# Patient Record
Sex: FEMALE | Race: OTHER | NOT HISPANIC OR LATINO | Employment: FULL TIME | URBAN - METROPOLITAN AREA
[De-identification: names, ages, dates, MRNs, and addresses within clinical notes are randomized per-mention and may not be internally consistent; named-entity substitution may affect disease eponyms.]

---

## 2023-05-08 ENCOUNTER — OFFICE VISIT (OUTPATIENT)
Dept: PRIMARY CARE | Facility: CLINIC | Age: 48
End: 2023-05-08
Payer: COMMERCIAL

## 2023-05-08 VITALS
HEIGHT: 63 IN | WEIGHT: 213 LBS | TEMPERATURE: 98.1 F | SYSTOLIC BLOOD PRESSURE: 127 MMHG | HEART RATE: 79 BPM | DIASTOLIC BLOOD PRESSURE: 82 MMHG | OXYGEN SATURATION: 97 % | BODY MASS INDEX: 37.74 KG/M2

## 2023-05-08 DIAGNOSIS — Z00.00 HEALTHCARE MAINTENANCE: ICD-10-CM

## 2023-05-08 DIAGNOSIS — Z76.89 ENCOUNTER TO ESTABLISH CARE: Primary | ICD-10-CM

## 2023-05-08 DIAGNOSIS — R73.9 BLOOD GLUCOSE ELEVATED: ICD-10-CM

## 2023-05-08 PROCEDURE — 99214 OFFICE O/P EST MOD 30 MIN: CPT | Performed by: STUDENT IN AN ORGANIZED HEALTH CARE EDUCATION/TRAINING PROGRAM

## 2023-05-08 PROCEDURE — 4010F ACE/ARB THERAPY RXD/TAKEN: CPT | Performed by: STUDENT IN AN ORGANIZED HEALTH CARE EDUCATION/TRAINING PROGRAM

## 2023-05-08 PROCEDURE — 1036F TOBACCO NON-USER: CPT | Performed by: STUDENT IN AN ORGANIZED HEALTH CARE EDUCATION/TRAINING PROGRAM

## 2023-05-08 PROCEDURE — 3046F HEMOGLOBIN A1C LEVEL >9.0%: CPT | Performed by: STUDENT IN AN ORGANIZED HEALTH CARE EDUCATION/TRAINING PROGRAM

## 2023-05-08 PROCEDURE — 3079F DIAST BP 80-89 MM HG: CPT | Performed by: STUDENT IN AN ORGANIZED HEALTH CARE EDUCATION/TRAINING PROGRAM

## 2023-05-08 PROCEDURE — 3074F SYST BP LT 130 MM HG: CPT | Performed by: STUDENT IN AN ORGANIZED HEALTH CARE EDUCATION/TRAINING PROGRAM

## 2023-05-08 ASSESSMENT — ENCOUNTER SYMPTOMS
DYSURIA: 0
LIGHT-HEADEDNESS: 0
MYALGIAS: 0
FEVER: 0
HEMATURIA: 0
COUGH: 0
SHORTNESS OF BREATH: 0
NAUSEA: 1
HEADACHES: 1
CHILLS: 0
DIARRHEA: 0
DIZZINESS: 1
POLYPHAGIA: 0
FATIGUE: 0
FREQUENCY: 1
ABDOMINAL PAIN: 0
PALPITATIONS: 0
CONSTIPATION: 0
POLYDIPSIA: 0
FLANK PAIN: 0
VOMITING: 0
ARTHRALGIAS: 0

## 2023-05-08 NOTE — PROGRESS NOTES
"Subjective   Patient ID: Pamela Sexton is a 47 y.o. female who presents for Establish Care (Elevated glucose ).    HPI     She is new to establish with a PCP at this office.  No acute concerns or complaints today.    She used to be on a medication for her migraines.  She has not had a migraine in about 7 years.  She thinks that it was stress related with her last job.    She has seen an Ob/Gyn to be established. She is up to date on her PAP smear and has an order for a mammogram and colonoscopy. She thinks that she saw them in January.    She has been monitoring her blood sugars at home.  She had gestational diabetes with her first child but not her second pregnancy which are twins.  She has diabetes on both sides of her family.    She had some labs for her work and she thinks her sugars are elevated.  Her fasting glucose levels have been in the 200s that she is measuring at home.    Review of Systems   Constitutional:  Negative for chills, fatigue and fever.   HENT:  Negative for congestion and postnasal drip.    Respiratory:  Negative for cough and shortness of breath.    Cardiovascular:  Negative for chest pain and palpitations.   Gastrointestinal:  Positive for nausea (intermittently). Negative for abdominal pain, constipation, diarrhea and vomiting.   Endocrine: Positive for polyuria. Negative for polydipsia and polyphagia.   Genitourinary:  Positive for frequency. Negative for dysuria, flank pain and hematuria.   Musculoskeletal:  Negative for arthralgias and myalgias.   Neurological:  Positive for dizziness (intermittent) and headaches. Negative for light-headedness.       Objective   /82   Pulse 79   Temp 36.7 °C (98.1 °F)   Ht 1.6 m (5' 3\")   Wt 96.6 kg (213 lb)   SpO2 97%   BMI 37.73 kg/m²     Physical Exam  Vitals and nursing note reviewed.   Constitutional:       General: She is not in acute distress.     Appearance: Normal appearance. She is obese. She is not ill-appearing or " toxic-appearing.   Cardiovascular:      Rate and Rhythm: Normal rate and regular rhythm.      Heart sounds: Normal heart sounds.   Pulmonary:      Effort: Pulmonary effort is normal.      Breath sounds: Normal breath sounds.   Abdominal:      General: Abdomen is flat. Bowel sounds are normal.      Palpations: Abdomen is soft.   Neurological:      Mental Status: She is alert.         Assessment/Plan   Problem List Items Addressed This Visit    None  Visit Diagnoses       Encounter to establish care    -  Primary    Blood glucose elevated        Relevant Orders    CBC and Auto Differential (Completed)    Comprehensive Metabolic Panel (Completed)    Lipid Panel (Completed)    Urinalysis with Reflex Microscopic (Completed)    Vitamin D, Total (Completed)    TSH with reflex to Free T4 if abnormal (Completed)    Hemoglobin A1C (Completed)    Albumin , Urine Random (Completed)    Healthcare maintenance        Relevant Orders    CBC and Auto Differential (Completed)    Comprehensive Metabolic Panel (Completed)    Lipid Panel (Completed)    Urinalysis with Reflex Microscopic (Completed)    Vitamin D, Total (Completed)    TSH with reflex to Free T4 if abnormal (Completed)          Patient presenting to establish care.  Blood glucose levels are elevated at home and diagnosis of diabetes is suspected.  Ordered lab work including an A1c as well as urine albumin.  Patient will have these done and then follow-up in the next 1 to 2 weeks for a wellness examination.  Printed off and given information and discussed healthy diet and exercise in addition to diabetic dieting and carbohydrate counting.  She will start making dietary changes as needed.  We will follow-up with any other health concerns at next appointment.  She is understanding and in agreement with this plan.

## 2023-05-09 ENCOUNTER — LAB (OUTPATIENT)
Dept: LAB | Facility: LAB | Age: 48
End: 2023-05-09
Payer: COMMERCIAL

## 2023-05-09 DIAGNOSIS — R73.9 BLOOD GLUCOSE ELEVATED: ICD-10-CM

## 2023-05-09 DIAGNOSIS — Z00.00 HEALTHCARE MAINTENANCE: ICD-10-CM

## 2023-05-09 PROCEDURE — 82043 UR ALBUMIN QUANTITATIVE: CPT

## 2023-05-09 PROCEDURE — 84443 ASSAY THYROID STIM HORMONE: CPT

## 2023-05-09 PROCEDURE — 85025 COMPLETE CBC W/AUTO DIFF WBC: CPT

## 2023-05-09 PROCEDURE — 82570 ASSAY OF URINE CREATININE: CPT

## 2023-05-09 PROCEDURE — 82306 VITAMIN D 25 HYDROXY: CPT

## 2023-05-09 PROCEDURE — 83036 HEMOGLOBIN GLYCOSYLATED A1C: CPT

## 2023-05-09 PROCEDURE — 36415 COLL VENOUS BLD VENIPUNCTURE: CPT

## 2023-05-09 PROCEDURE — 81001 URINALYSIS AUTO W/SCOPE: CPT

## 2023-05-09 PROCEDURE — 80053 COMPREHEN METABOLIC PANEL: CPT

## 2023-05-09 PROCEDURE — 80061 LIPID PANEL: CPT

## 2023-05-10 LAB
ALANINE AMINOTRANSFERASE (SGPT) (U/L) IN SER/PLAS: 113 U/L (ref 7–45)
ALBUMIN (G/DL) IN SER/PLAS: 4 G/DL (ref 3.4–5)
ALBUMIN (MG/L) IN URINE: 47.1 MG/L
ALBUMIN/CREATININE (UG/MG) IN URINE: 18.5 UG/MG CRT (ref 0–30)
ALKALINE PHOSPHATASE (U/L) IN SER/PLAS: 77 U/L (ref 33–110)
ANION GAP IN SER/PLAS: 14 MMOL/L (ref 10–20)
APPEARANCE, URINE: ABNORMAL
ASPARTATE AMINOTRANSFERASE (SGOT) (U/L) IN SER/PLAS: 67 U/L (ref 9–39)
BASOPHILS (10*3/UL) IN BLOOD BY AUTOMATED COUNT: 0.08 X10E9/L (ref 0–0.1)
BASOPHILS/100 LEUKOCYTES IN BLOOD BY AUTOMATED COUNT: 0.9 % (ref 0–2)
BILIRUBIN TOTAL (MG/DL) IN SER/PLAS: 0.5 MG/DL (ref 0–1.2)
BILIRUBIN, URINE: NEGATIVE
BLOOD, URINE: NEGATIVE
CALCIDIOL (25 OH VITAMIN D3) (NG/ML) IN SER/PLAS: 17 NG/ML
CALCIUM (MG/DL) IN SER/PLAS: 9.4 MG/DL (ref 8.6–10.6)
CARBON DIOXIDE, TOTAL (MMOL/L) IN SER/PLAS: 25 MMOL/L (ref 21–32)
CHLORIDE (MMOL/L) IN SER/PLAS: 102 MMOL/L (ref 98–107)
CHOLESTEROL (MG/DL) IN SER/PLAS: 174 MG/DL (ref 0–199)
CHOLESTEROL IN HDL (MG/DL) IN SER/PLAS: 51.1 MG/DL
CHOLESTEROL/HDL RATIO: 3.4
COLOR, URINE: YELLOW
CREATININE (MG/DL) IN SER/PLAS: 0.74 MG/DL (ref 0.5–1.05)
CREATININE (MG/DL) IN URINE: 254 MG/DL (ref 20–320)
EOSINOPHILS (10*3/UL) IN BLOOD BY AUTOMATED COUNT: 0.33 X10E9/L (ref 0–0.7)
EOSINOPHILS/100 LEUKOCYTES IN BLOOD BY AUTOMATED COUNT: 3.8 % (ref 0–6)
ERYTHROCYTE DISTRIBUTION WIDTH (RATIO) BY AUTOMATED COUNT: 13.2 % (ref 11.5–14.5)
ERYTHROCYTE MEAN CORPUSCULAR HEMOGLOBIN CONCENTRATION (G/DL) BY AUTOMATED: 31.1 G/DL (ref 32–36)
ERYTHROCYTE MEAN CORPUSCULAR VOLUME (FL) BY AUTOMATED COUNT: 89 FL (ref 80–100)
ERYTHROCYTES (10*6/UL) IN BLOOD BY AUTOMATED COUNT: 5.22 X10E12/L (ref 4–5.2)
ESTIMATED AVERAGE GLUCOSE FOR HBA1C: 252 MG/DL
GFR FEMALE: >90 ML/MIN/1.73M2
GLUCOSE (MG/DL) IN SER/PLAS: 222 MG/DL (ref 74–99)
GLUCOSE, URINE: ABNORMAL MG/DL
HEMATOCRIT (%) IN BLOOD BY AUTOMATED COUNT: 46.6 % (ref 36–46)
HEMOGLOBIN (G/DL) IN BLOOD: 14.5 G/DL (ref 12–16)
HEMOGLOBIN A1C/HEMOGLOBIN TOTAL IN BLOOD: 10.4 %
IMMATURE GRANULOCYTES/100 LEUKOCYTES IN BLOOD BY AUTOMATED COUNT: 1 % (ref 0–0.9)
KETONES, URINE: ABNORMAL MG/DL
LDL: 85 MG/DL (ref 0–99)
LEUKOCYTE ESTERASE, URINE: ABNORMAL
LEUKOCYTES (10*3/UL) IN BLOOD BY AUTOMATED COUNT: 8.7 X10E9/L (ref 4.4–11.3)
LYMPHOCYTES (10*3/UL) IN BLOOD BY AUTOMATED COUNT: 2.17 X10E9/L (ref 1.2–4.8)
LYMPHOCYTES/100 LEUKOCYTES IN BLOOD BY AUTOMATED COUNT: 24.9 % (ref 13–44)
MONOCYTES (10*3/UL) IN BLOOD BY AUTOMATED COUNT: 0.53 X10E9/L (ref 0.1–1)
MONOCYTES/100 LEUKOCYTES IN BLOOD BY AUTOMATED COUNT: 6.1 % (ref 2–10)
MUCUS, URINE: NORMAL /LPF
NEUTROPHILS (10*3/UL) IN BLOOD BY AUTOMATED COUNT: 5.52 X10E9/L (ref 1.2–7.7)
NEUTROPHILS/100 LEUKOCYTES IN BLOOD BY AUTOMATED COUNT: 63.3 % (ref 40–80)
NITRITE, URINE: NEGATIVE
NRBC (PER 100 WBCS) BY AUTOMATED COUNT: 0 /100 WBC (ref 0–0)
PH, URINE: 5 (ref 5–8)
PLATELETS (10*3/UL) IN BLOOD AUTOMATED COUNT: 304 X10E9/L (ref 150–450)
POTASSIUM (MMOL/L) IN SER/PLAS: 4.1 MMOL/L (ref 3.5–5.3)
PROTEIN TOTAL: 6.9 G/DL (ref 6.4–8.2)
PROTEIN, URINE: NEGATIVE MG/DL
RBC, URINE: NORMAL /HPF (ref 0–5)
SODIUM (MMOL/L) IN SER/PLAS: 137 MMOL/L (ref 136–145)
SPECIFIC GRAVITY, URINE: 1.03 (ref 1–1.03)
SQUAMOUS EPITHELIAL CELLS, URINE: 6 /HPF
THYROTROPIN (MIU/L) IN SER/PLAS BY DETECTION LIMIT <= 0.05 MIU/L: 2.87 MIU/L (ref 0.44–3.98)
TRIGLYCERIDE (MG/DL) IN SER/PLAS: 189 MG/DL (ref 0–149)
UREA NITROGEN (MG/DL) IN SER/PLAS: 16 MG/DL (ref 6–23)
UROBILINOGEN, URINE: <2 MG/DL (ref 0–1.9)
VLDL: 38 MG/DL (ref 0–40)
WBC, URINE: NORMAL /HPF (ref 0–5)

## 2023-05-18 ENCOUNTER — OFFICE VISIT (OUTPATIENT)
Dept: PRIMARY CARE | Facility: CLINIC | Age: 48
End: 2023-05-18
Payer: COMMERCIAL

## 2023-05-18 VITALS
HEART RATE: 82 BPM | TEMPERATURE: 98.1 F | HEIGHT: 63 IN | SYSTOLIC BLOOD PRESSURE: 122 MMHG | WEIGHT: 211 LBS | OXYGEN SATURATION: 98 % | BODY MASS INDEX: 37.39 KG/M2 | DIASTOLIC BLOOD PRESSURE: 82 MMHG

## 2023-05-18 DIAGNOSIS — K76.0 FATTY LIVER: ICD-10-CM

## 2023-05-18 DIAGNOSIS — Z00.00 ENCOUNTER FOR WELLNESS EXAMINATION IN ADULT: Primary | ICD-10-CM

## 2023-05-18 DIAGNOSIS — E55.9 VITAMIN D DEFICIENCY: ICD-10-CM

## 2023-05-18 DIAGNOSIS — E11.65 TYPE 2 DIABETES MELLITUS WITH HYPERGLYCEMIA, WITHOUT LONG-TERM CURRENT USE OF INSULIN (MULTI): ICD-10-CM

## 2023-05-18 PROCEDURE — 99396 PREV VISIT EST AGE 40-64: CPT | Performed by: STUDENT IN AN ORGANIZED HEALTH CARE EDUCATION/TRAINING PROGRAM

## 2023-05-18 PROCEDURE — 3079F DIAST BP 80-89 MM HG: CPT | Performed by: STUDENT IN AN ORGANIZED HEALTH CARE EDUCATION/TRAINING PROGRAM

## 2023-05-18 PROCEDURE — 3046F HEMOGLOBIN A1C LEVEL >9.0%: CPT | Performed by: STUDENT IN AN ORGANIZED HEALTH CARE EDUCATION/TRAINING PROGRAM

## 2023-05-18 PROCEDURE — 3074F SYST BP LT 130 MM HG: CPT | Performed by: STUDENT IN AN ORGANIZED HEALTH CARE EDUCATION/TRAINING PROGRAM

## 2023-05-18 PROCEDURE — 4010F ACE/ARB THERAPY RXD/TAKEN: CPT | Performed by: STUDENT IN AN ORGANIZED HEALTH CARE EDUCATION/TRAINING PROGRAM

## 2023-05-18 PROCEDURE — 1036F TOBACCO NON-USER: CPT | Performed by: STUDENT IN AN ORGANIZED HEALTH CARE EDUCATION/TRAINING PROGRAM

## 2023-05-18 RX ORDER — LISINOPRIL 2.5 MG/1
2.5 TABLET ORAL DAILY
Qty: 30 TABLET | Refills: 1 | Status: SHIPPED | OUTPATIENT
Start: 2023-05-18 | End: 2023-07-13 | Stop reason: SDUPTHER

## 2023-05-18 RX ORDER — ERGOCALCIFEROL 1.25 MG/1
50000 CAPSULE ORAL
Qty: 8 CAPSULE | Refills: 0 | Status: SHIPPED | OUTPATIENT
Start: 2023-05-18 | End: 2023-07-13

## 2023-05-18 RX ORDER — METFORMIN HYDROCHLORIDE 500 MG/1
500 TABLET, EXTENDED RELEASE ORAL
Qty: 90 TABLET | Refills: 0 | Status: SHIPPED | OUTPATIENT
Start: 2023-05-18 | End: 2023-06-02 | Stop reason: SDUPTHER

## 2023-05-18 ASSESSMENT — ENCOUNTER SYMPTOMS
LIGHT-HEADEDNESS: 0
VOMITING: 0
NAUSEA: 0
RHINORRHEA: 0
ARTHRALGIAS: 0
MYALGIAS: 0
COUGH: 0
DIZZINESS: 0
SHORTNESS OF BREATH: 0
FREQUENCY: 0
DYSPHORIC MOOD: 0
NERVOUS/ANXIOUS: 0
CONSTIPATION: 0
FATIGUE: 0
CHILLS: 0
NUMBNESS: 0
FEVER: 0
DYSURIA: 0
PALPITATIONS: 0
COLOR CHANGE: 0
ABDOMINAL PAIN: 0
DIARRHEA: 0

## 2023-05-18 NOTE — PROGRESS NOTES
"Subjective   Patient ID: Pamela Sexton is a 47 y.o. female who presents for Annual Exam.    HPI     She would like to discuss her blood sugars.  She is starting to  eat more fruits and vegetables and paying attention to what she is eating.  She read through the diabetic diet handouts given last time.    Dental: Has upcoming appointment in June 2023.  Vision: Glasses. Last eye exam January 2023    Diet: Well balanced. Working at incorporating diabetic diet.  Exercise: Treadmill.   Caffeine: Some Coffee.  Fluids: Plenty  Supplements: None.    Tobacco: None. Previously smoked for 3 years. Back in high school.  Alcohol: Rarely. 2-3 times a year.  Recreational Drugs: None    Last Colonoscopy: She has an order placed. She needs to call for an appointment.  Last Pap smear: Seeing with Ob/Gyn. PAP done January 2023.  Mammogram: Order placed. She will be calling to get it scheduled.    Influenza: Up to date.  Tetanus: More than 10 years. Recommended.  Pneumonia: Recommended   COVID: Recommended    Review of Systems   Constitutional:  Negative for chills, fatigue and fever.   HENT:  Negative for congestion and rhinorrhea.    Eyes:  Negative for visual disturbance.   Respiratory:  Negative for cough and shortness of breath.    Cardiovascular:  Negative for chest pain and palpitations.   Gastrointestinal:  Negative for abdominal pain, constipation, diarrhea, nausea and vomiting.   Genitourinary:  Negative for dysuria and frequency.   Musculoskeletal:  Negative for arthralgias and myalgias.   Skin:  Negative for color change and rash.   Neurological:  Negative for dizziness, light-headedness and numbness.   Psychiatric/Behavioral:  Negative for dysphoric mood. The patient is not nervous/anxious.        Objective   /82   Pulse 82   Temp 36.7 °C (98.1 °F)   Ht 1.6 m (5' 3\")   Wt 95.7 kg (211 lb)   SpO2 98%   BMI 37.38 kg/m²   BSA Body surface area is 2.06 meters squared.      Physical Exam  Cardiovascular:      Rate " and Rhythm: Normal rate.       Lab on 05/09/2023   Component Date Value Ref Range Status    WBC 05/09/2023 8.7  4.4 - 11.3 x10E9/L Final    nRBC 05/09/2023 0.0  0.0 - 0.0 /100 WBC Final    RBC 05/09/2023 5.22 (H)  4.00 - 5.20 x10E12/L Final    Hemoglobin 05/09/2023 14.5  12.0 - 16.0 g/dL Final    Hematocrit 05/09/2023 46.6 (H)  36.0 - 46.0 % Final    MCV 05/09/2023 89  80 - 100 fL Final    MCHC 05/09/2023 31.1 (L)  32.0 - 36.0 g/dL Final    Platelets 05/09/2023 304  150 - 450 x10E9/L Final    RDW 05/09/2023 13.2  11.5 - 14.5 % Final    Neutrophils % 05/09/2023 63.3  40.0 - 80.0 % Final    Immature Granulocytes %, Automated 05/09/2023 1.0 (H)  0.0 - 0.9 % Final     Immature Granulocyte Count (IG) includes promyelocytes,    myelocytes and metamyelocytes but does not include bands.   Percent differential counts (%) should be interpreted in the   context of the absolute cell counts (cells/L).    Lymphocytes % 05/09/2023 24.9  13.0 - 44.0 % Final    Monocytes % 05/09/2023 6.1  2.0 - 10.0 % Final    Eosinophils % 05/09/2023 3.8  0.0 - 6.0 % Final    Basophils % 05/09/2023 0.9  0.0 - 2.0 % Final    Neutrophils Absolute 05/09/2023 5.52  1.20 - 7.70 x10E9/L Final    Lymphocytes Absolute 05/09/2023 2.17  1.20 - 4.80 x10E9/L Final    Monocytes Absolute 05/09/2023 0.53  0.10 - 1.00 x10E9/L Final    Eosinophils Absolute 05/09/2023 0.33  0.00 - 0.70 x10E9/L Final    Basophils Absolute 05/09/2023 0.08  0.00 - 0.10 x10E9/L Final    Glucose 05/09/2023 222 (H)  74 - 99 mg/dL Final    Sodium 05/09/2023 137  136 - 145 mmol/L Final    Potassium 05/09/2023 4.1  3.5 - 5.3 mmol/L Final    Chloride 05/09/2023 102  98 - 107 mmol/L Final    Bicarbonate 05/09/2023 25  21 - 32 mmol/L Final    Anion Gap 05/09/2023 14  10 - 20 mmol/L Final    Urea Nitrogen 05/09/2023 16  6 - 23 mg/dL Final    Creatinine 05/09/2023 0.74  0.50 - 1.05 mg/dL Final    GFR Female 05/09/2023 >90  >90 mL/min/1.73m2 Final     CALCULATIONS OF ESTIMATED GFR ARE PERFORMED    USING THE 2021 CKD-EPI STUDY REFIT EQUATION   WITHOUT THE RACE VARIABLE FOR THE IDMS-TRACEABLE   CREATININE METHODS.    https://jasn.asnjournals.org/content/early/2021/09/22/ASN.4095712824    Calcium 05/09/2023 9.4  8.6 - 10.6 mg/dL Final    Albumin 05/09/2023 4.0  3.4 - 5.0 g/dL Final    Alkaline Phosphatase 05/09/2023 77  33 - 110 U/L Final    Total Protein 05/09/2023 6.9  6.4 - 8.2 g/dL Final    AST 05/09/2023 67 (H)  9 - 39 U/L Final    Total Bilirubin 05/09/2023 0.5  0.0 - 1.2 mg/dL Final    ALT (SGPT) 05/09/2023 113 (H)  7 - 45 U/L Final     Patients treated with Sulfasalazine may generate    falsely decreased results for ALT.    Cholesterol 05/09/2023 174  0 - 199 mg/dL Final    .      AGE      DESIRABLE   BORDERLINE HIGH   HIGH     0-19 Y     0 - 169       170 - 199     >/= 200    20-24 Y     0 - 189       190 - 224     >/= 225         >24 Y     0 - 199       200 - 239     >/= 240   **All ranges are based on fasting samples. Specific   therapeutic targets will vary based on patient-specific   cardiac risk.  .   Pediatric guidelines reference:Pediatrics 2011, 128(S5).   Adult guidelines reference: NCEP ATPIII Guidelines,     PRIYANKA 2001, 258:2486-97  .   Venipuncture immediately after or during the    administration of Metamizole may lead to falsely   low results. Testing should be performed immediately   prior to Metamizole dosing.    HDL 05/09/2023 51.1  mg/dL Final    .      AGE      VERY LOW   LOW     NORMAL    HIGH       0-19 Y       < 35   < 40     40-45     ----    20-24 Y       ----   < 40       >45     ----      >24 Y       ----   < 40     40-60      >60  .    Cholesterol/HDL Ratio 05/09/2023 3.4   Final    REF VALUES  DESIRABLE  < 3.4  HIGH RISK  > 5.0    LDL 05/09/2023 85  0 - 99 mg/dL Final    .                           NEAR      BORD      AGE      DESIRABLE  OPTIMAL    HIGH     HIGH     VERY HIGH     0-19 Y     0 - 109     ---    110-129   >/= 130     ----    20-24 Y     0 - 119     ---    120-159    >/= 160     ----      >24 Y     0 -  99   100-129  130-159   160-189     >/=190  .    VLDL 05/09/2023 38  0 - 40 mg/dL Final    Triglycerides 05/09/2023 189 (H)  0 - 149 mg/dL Final    .      AGE      DESIRABLE   BORDERLINE HIGH   HIGH     VERY HIGH   0 D-90 D    19 - 174         ----         ----        ----  91 D- 9 Y     0 -  74        75 -  99     >/= 100      ----    10-19 Y     0 -  89        90 - 129     >/= 130      ----    20-24 Y     0 - 114       115 - 149     >/= 150      ----         >24 Y     0 - 149       150 - 199    200- 499    >/= 500  .   Venipuncture immediately after or during the    administration of Metamizole may lead to falsely   low results. Testing should be performed immediately   prior to Metamizole dosing.    Color, Urine 05/09/2023 YELLOW  STRAW,YELLOW Final    Appearance, Urine 05/09/2023 HAZY  CLEAR Final    Specific Gravity, Urine 05/09/2023 1.030  1.005 - 1.035 Final    pH, Urine 05/09/2023 5.0  5.0 - 8.0 Final    Protein, Urine 05/09/2023 NEGATIVE  NEGATIVE mg/dL Final    Glucose, Urine 05/09/2023 50 (TRACE) (A)  NEGATIVE mg/dL Final    Blood, Urine 05/09/2023 NEGATIVE  NEGATIVE Final    Ketones, Urine 05/09/2023 5 (TRACE) (A)  NEGATIVE mg/dL Final    Bilirubin, Urine 05/09/2023 NEGATIVE  NEGATIVE Final    Urobilinogen, Urine 05/09/2023 <2.0  0.0 - 1.9 mg/dL Final    Nitrite, Urine 05/09/2023 NEGATIVE  NEGATIVE Final    Leukocyte Esterase, Urine 05/09/2023 TRACE (A)  NEGATIVE Final    Vitamin D, 25-Hydroxy 05/09/2023 17 (A)  ng/mL Final    .  DEFICIENCY:         < 20   NG/ML  INSUFFICIENCY:      20-29  NG/ML  SUFFICIENCY:         NG/ML    THIS ASSAY ACCURATELY QUANTIFIES THE SUM OF  VITAMIN D3, 25-HYDROXY AND VIT D2,25-HYDROXY.    TSH 05/09/2023 2.87  0.44 - 3.98 mIU/L Final     TSH testing is performed using different testing    methodology at [facility] than at other    system hospitals. Direct result comparisons should    only be made within the same  method.    Hemoglobin A1C 05/09/2023 10.4 (A)  % Final         Diagnosis of Diabetes-Adults   Non-Diabetic: < or = 5.6%   Increased risk for developing diabetes: 5.7-6.4%   Diagnostic of diabetes: > or = 6.5%  .       Monitoring of Diabetes                Age (y)     Therapeutic Goal (%)   Adults:          >18           <7.0   Pediatrics:    13-18           <7.5                   7-12           <8.0                   0- 6            7.5-8.5   American Diabetes Association. Diabetes Care 33(S1), Jan 2010.    Estimated Average Glucose 05/09/2023 252  MG/DL Final    ALBUMIN (MG/L) IN URINE 05/09/2023 47.1  Not Established mg/L Final    Albumin/Creatine Ratio 05/09/2023 18.5  0.0 - 30.0 ug/mg crt Final    Creatinine, Urine 05/09/2023 254.0  20.0 - 320.0 mg/dL Final    WBC, Urine 05/09/2023 None  0 - 5 /HPF Final    RBC, Urine 05/09/2023 None  0 - 5 /HPF Final    Squamous Epithelial Cells, Urine 05/09/2023 6  /HPF Final    Mucus, Urine 05/09/2023 4+  /LPF Final     No current outpatient medications on file prior to visit.     No current facility-administered medications on file prior to visit.     No images are attached to the encounter.        Assessment/Plan   Problem List Items Addressed This Visit          Digestive    Fatty liver     Diagnosed previously.  Cholesterol levels within the normal range.  Mildly elevated triglycerides.  Patient working at improving her diet.  Planning on rechecking levels at a later time.            Endocrine/Metabolic    Type 2 diabetes mellitus with hyperglycemia, without long-term current use of insulin (CMS/[facility])     Recently diagnosed. Patient started on metformin. Overall doing well. She is making significant dietary changes to reduce her blood sugars. She is incorporating more of a diabetic diet. Plan on repeating A1c in 3 months.          Relevant Medications    lisinopril 2.5 mg tablet    Other Relevant Orders    Comprehensive Metabolic Panel    CBC and Auto Differential     Vitamin D deficiency     Replacement vitamin D levels ordered.  Follow-up vitamin D level ordered to be done in about 2 months.         Relevant Medications    ergocalciferol (Vitamin D-2) 1.25 MG (64393 UT) capsule    Other Relevant Orders    Vitamin D, Total       Other    Encounter for wellness examination in adult - Primary     Patient is overall doing well.  No acute concerns or complaints today.  She has significantly altered her diet and is read through the diabetic diet handouts given to her at her last appointment.  She is improving what she is eating and is generally starting to feel much better.  Continued to encourage healthy diet and exercise.  Metformin and lisinopril ordered.  Reviewed the results of her recent lab work.  Vitamin D replacement ordered with repeat levels in 2 months.  We will also recheck her CMP and CBC.  Plan on repeat metformin in about 3 months.  Patient will come in for a 1 month checkup to see how she is continuing to do.  She will come in sooner for acute concerns or complaints.

## 2023-05-18 NOTE — PATIENT INSTRUCTIONS
Carol Pastizzy  [employer]    Please take a multivitamin daily for extra vitamin.    Start with the metformin 1 tablet once a day, then after a few days take 1 tablet twice daily, then after a few days 1 tablet in the morning and 2 tablets in the evening,  then after a few days take 2 tablets in the morning and 2 in the evening.    Take fasting blood glucose every morning. Can take at any other time as well. Make a log and bring with you to your next appointment.    Follow up in about 5 weeks.

## 2023-06-02 DIAGNOSIS — E11.65 TYPE 2 DIABETES MELLITUS WITH HYPERGLYCEMIA, WITHOUT LONG-TERM CURRENT USE OF INSULIN (MULTI): ICD-10-CM

## 2023-06-02 RX ORDER — METFORMIN HYDROCHLORIDE 500 MG/1
1000 TABLET, EXTENDED RELEASE ORAL
Qty: 360 TABLET | Refills: 1 | Status: SHIPPED | OUTPATIENT
Start: 2023-06-02 | End: 2023-12-05 | Stop reason: SDUPTHER

## 2023-06-04 NOTE — ASSESSMENT & PLAN NOTE
Diagnosed previously.  Cholesterol levels within the normal range.  Mildly elevated triglycerides.  Patient working at improving her diet.  Planning on rechecking levels at a later time.

## 2023-06-04 NOTE — ASSESSMENT & PLAN NOTE
Recently diagnosed. Patient started on metformin. Overall doing well. She is making significant dietary changes to reduce her blood sugars. She is incorporating more of a diabetic diet. Plan on repeating A1c in 3 months.

## 2023-06-04 NOTE — ASSESSMENT & PLAN NOTE
Patient is overall doing well.  No acute concerns or complaints today.  She has significantly altered her diet and is read through the diabetic diet handouts given to her at her last appointment.  She is improving what she is eating and is generally starting to feel much better.  Continued to encourage healthy diet and exercise.  Metformin and lisinopril ordered.  Reviewed the results of her recent lab work.  Vitamin D replacement ordered with repeat levels in 2 months.  We will also recheck her CMP and CBC.  Plan on repeat metformin in about 3 months.  Patient will come in for a 1 month checkup to see how she is continuing to do.  She will come in sooner for acute concerns or complaints.

## 2023-06-04 NOTE — ASSESSMENT & PLAN NOTE
Replacement vitamin D levels ordered.  Follow-up vitamin D level ordered to be done in about 2 months.

## 2023-06-20 ENCOUNTER — LAB (OUTPATIENT)
Dept: LAB | Facility: LAB | Age: 48
End: 2023-06-20
Payer: COMMERCIAL

## 2023-06-20 DIAGNOSIS — E55.9 VITAMIN D DEFICIENCY: ICD-10-CM

## 2023-06-20 DIAGNOSIS — E11.65 TYPE 2 DIABETES MELLITUS WITH HYPERGLYCEMIA, WITHOUT LONG-TERM CURRENT USE OF INSULIN (MULTI): ICD-10-CM

## 2023-06-20 PROCEDURE — 85025 COMPLETE CBC W/AUTO DIFF WBC: CPT

## 2023-06-20 PROCEDURE — 36415 COLL VENOUS BLD VENIPUNCTURE: CPT

## 2023-06-20 PROCEDURE — 82306 VITAMIN D 25 HYDROXY: CPT

## 2023-06-20 PROCEDURE — 80053 COMPREHEN METABOLIC PANEL: CPT

## 2023-06-21 LAB
ALANINE AMINOTRANSFERASE (SGPT) (U/L) IN SER/PLAS: 59 U/L (ref 7–45)
ALBUMIN (G/DL) IN SER/PLAS: 4.5 G/DL (ref 3.4–5)
ALKALINE PHOSPHATASE (U/L) IN SER/PLAS: 96 U/L (ref 33–110)
ANION GAP IN SER/PLAS: 12 MMOL/L (ref 10–20)
ASPARTATE AMINOTRANSFERASE (SGOT) (U/L) IN SER/PLAS: 28 U/L (ref 9–39)
BASOPHILS (10*3/UL) IN BLOOD BY AUTOMATED COUNT: 0.08 X10E9/L (ref 0–0.1)
BASOPHILS/100 LEUKOCYTES IN BLOOD BY AUTOMATED COUNT: 0.7 % (ref 0–2)
BILIRUBIN TOTAL (MG/DL) IN SER/PLAS: 0.3 MG/DL (ref 0–1.2)
CALCIDIOL (25 OH VITAMIN D3) (NG/ML) IN SER/PLAS: 46 NG/ML
CALCIUM (MG/DL) IN SER/PLAS: 9.6 MG/DL (ref 8.6–10.6)
CARBON DIOXIDE, TOTAL (MMOL/L) IN SER/PLAS: 27 MMOL/L (ref 21–32)
CHLORIDE (MMOL/L) IN SER/PLAS: 103 MMOL/L (ref 98–107)
CREATININE (MG/DL) IN SER/PLAS: 0.69 MG/DL (ref 0.5–1.05)
EOSINOPHILS (10*3/UL) IN BLOOD BY AUTOMATED COUNT: 0.49 X10E9/L (ref 0–0.7)
EOSINOPHILS/100 LEUKOCYTES IN BLOOD BY AUTOMATED COUNT: 4 % (ref 0–6)
ERYTHROCYTE DISTRIBUTION WIDTH (RATIO) BY AUTOMATED COUNT: 13.2 % (ref 11.5–14.5)
ERYTHROCYTE MEAN CORPUSCULAR HEMOGLOBIN CONCENTRATION (G/DL) BY AUTOMATED: 32.1 G/DL (ref 32–36)
ERYTHROCYTE MEAN CORPUSCULAR VOLUME (FL) BY AUTOMATED COUNT: 88 FL (ref 80–100)
ERYTHROCYTES (10*6/UL) IN BLOOD BY AUTOMATED COUNT: 5.03 X10E12/L (ref 4–5.2)
GFR FEMALE: >90 ML/MIN/1.73M2
GLUCOSE (MG/DL) IN SER/PLAS: 135 MG/DL (ref 74–99)
HEMATOCRIT (%) IN BLOOD BY AUTOMATED COUNT: 44.3 % (ref 36–46)
HEMOGLOBIN (G/DL) IN BLOOD: 14.2 G/DL (ref 12–16)
IMMATURE GRANULOCYTES/100 LEUKOCYTES IN BLOOD BY AUTOMATED COUNT: 0.5 % (ref 0–0.9)
LEUKOCYTES (10*3/UL) IN BLOOD BY AUTOMATED COUNT: 12.1 X10E9/L (ref 4.4–11.3)
LYMPHOCYTES (10*3/UL) IN BLOOD BY AUTOMATED COUNT: 3.11 X10E9/L (ref 1.2–4.8)
LYMPHOCYTES/100 LEUKOCYTES IN BLOOD BY AUTOMATED COUNT: 25.6 % (ref 13–44)
MONOCYTES (10*3/UL) IN BLOOD BY AUTOMATED COUNT: 0.65 X10E9/L (ref 0.1–1)
MONOCYTES/100 LEUKOCYTES IN BLOOD BY AUTOMATED COUNT: 5.4 % (ref 2–10)
NEUTROPHILS (10*3/UL) IN BLOOD BY AUTOMATED COUNT: 7.75 X10E9/L (ref 1.2–7.7)
NEUTROPHILS/100 LEUKOCYTES IN BLOOD BY AUTOMATED COUNT: 63.8 % (ref 40–80)
NRBC (PER 100 WBCS) BY AUTOMATED COUNT: 0 /100 WBC (ref 0–0)
PLATELETS (10*3/UL) IN BLOOD AUTOMATED COUNT: 382 X10E9/L (ref 150–450)
POTASSIUM (MMOL/L) IN SER/PLAS: 3.9 MMOL/L (ref 3.5–5.3)
PROTEIN TOTAL: 7.6 G/DL (ref 6.4–8.2)
SODIUM (MMOL/L) IN SER/PLAS: 138 MMOL/L (ref 136–145)
UREA NITROGEN (MG/DL) IN SER/PLAS: 13 MG/DL (ref 6–23)

## 2023-06-22 ENCOUNTER — APPOINTMENT (OUTPATIENT)
Dept: PRIMARY CARE | Facility: CLINIC | Age: 48
End: 2023-06-22
Payer: COMMERCIAL

## 2023-06-26 ENCOUNTER — OFFICE VISIT (OUTPATIENT)
Dept: PRIMARY CARE | Facility: CLINIC | Age: 48
End: 2023-06-26
Payer: COMMERCIAL

## 2023-06-26 VITALS
DIASTOLIC BLOOD PRESSURE: 75 MMHG | OXYGEN SATURATION: 98 % | HEIGHT: 63 IN | HEART RATE: 71 BPM | SYSTOLIC BLOOD PRESSURE: 112 MMHG | TEMPERATURE: 97.4 F | BODY MASS INDEX: 37.21 KG/M2 | WEIGHT: 210 LBS

## 2023-06-26 DIAGNOSIS — E11.65 TYPE 2 DIABETES MELLITUS WITH HYPERGLYCEMIA, WITHOUT LONG-TERM CURRENT USE OF INSULIN (MULTI): Primary | ICD-10-CM

## 2023-06-26 DIAGNOSIS — K76.0 FATTY LIVER: ICD-10-CM

## 2023-06-26 DIAGNOSIS — E55.9 VITAMIN D DEFICIENCY: ICD-10-CM

## 2023-06-26 PROCEDURE — 3046F HEMOGLOBIN A1C LEVEL >9.0%: CPT | Performed by: STUDENT IN AN ORGANIZED HEALTH CARE EDUCATION/TRAINING PROGRAM

## 2023-06-26 PROCEDURE — 3078F DIAST BP <80 MM HG: CPT | Performed by: STUDENT IN AN ORGANIZED HEALTH CARE EDUCATION/TRAINING PROGRAM

## 2023-06-26 PROCEDURE — 4010F ACE/ARB THERAPY RXD/TAKEN: CPT | Performed by: STUDENT IN AN ORGANIZED HEALTH CARE EDUCATION/TRAINING PROGRAM

## 2023-06-26 PROCEDURE — 99213 OFFICE O/P EST LOW 20 MIN: CPT | Performed by: STUDENT IN AN ORGANIZED HEALTH CARE EDUCATION/TRAINING PROGRAM

## 2023-06-26 PROCEDURE — 1036F TOBACCO NON-USER: CPT | Performed by: STUDENT IN AN ORGANIZED HEALTH CARE EDUCATION/TRAINING PROGRAM

## 2023-06-26 PROCEDURE — 3074F SYST BP LT 130 MM HG: CPT | Performed by: STUDENT IN AN ORGANIZED HEALTH CARE EDUCATION/TRAINING PROGRAM

## 2023-06-26 ASSESSMENT — ENCOUNTER SYMPTOMS
RHINORRHEA: 0
CONSTIPATION: 0
DIARRHEA: 0
VOMITING: 0
CHILLS: 0
FATIGUE: 0
LIGHT-HEADEDNESS: 0
FEVER: 0
ABDOMINAL PAIN: 0
PALPITATIONS: 0
FREQUENCY: 0
NUMBNESS: 0
COUGH: 0
COLOR CHANGE: 0
SHORTNESS OF BREATH: 0
DYSPHORIC MOOD: 0
DIZZINESS: 0
DYSURIA: 0
NERVOUS/ANXIOUS: 0
NAUSEA: 0

## 2023-06-26 NOTE — PROGRESS NOTES
"Subjective   Patient ID: Pamela Sexton is a 47 y.o. female who presents for Follow-up.    HPI     Blood sugars running around the 140s at home. Most recent CMP with glucose of 135.  She states that she has been doing really well at home with helping to fix her diet.  She states that she has been taking the metformin as prescribed.  She did have a little bit of symptoms when for starting but she denies having any more at this point.  She is really been watching her carbohydrate intake.  She has been trying to do more exercise on a regular basis as well.  Discussed continued management of diabetes.  She would like to wait to see what her glucose control is when she is next due for her A1c before any other medications at this time.  She feels that she is made pretty substantial improvements since her last appointment.    Review of Systems   Constitutional:  Negative for chills, fatigue and fever.   HENT:  Negative for congestion and rhinorrhea.    Respiratory:  Negative for cough and shortness of breath.    Cardiovascular:  Negative for chest pain and palpitations.   Gastrointestinal:  Negative for abdominal pain, constipation, diarrhea, nausea and vomiting.   Genitourinary:  Negative for dysuria and frequency.   Skin:  Negative for color change and rash.   Neurological:  Negative for dizziness, light-headedness and numbness.   Psychiatric/Behavioral:  Negative for dysphoric mood. The patient is not nervous/anxious.        Objective   /75   Pulse 71   Temp 36.3 °C (97.4 °F)   Ht 1.6 m (5' 3\")   Wt 95.3 kg (210 lb)   SpO2 98%   BMI 37.20 kg/m²     Physical Exam  Vitals and nursing note reviewed.   Constitutional:       General: She is not in acute distress.     Appearance: Normal appearance. She is obese. She is not ill-appearing or toxic-appearing.   Cardiovascular:      Rate and Rhythm: Normal rate and regular rhythm.      Heart sounds: Normal heart sounds.   Pulmonary:      Effort: Pulmonary effort is " normal.      Breath sounds: Normal breath sounds.   Abdominal:      General: Abdomen is flat. Bowel sounds are normal.      Palpations: Abdomen is soft.   Neurological:      Mental Status: She is alert.         Assessment/Plan   Problem List Items Addressed This Visit       Type 2 diabetes mellitus with hyperglycemia, without long-term current use of insulin (CMS/[facility]) - Primary    Relevant Orders    Hemoglobin A1C    Vitamin D deficiency    Fatty liver    Relevant Orders    Hemoglobin A1C     Patient overall doing really well.  She has been making significant dietary changes.  Most recent CMP with a blood glucose of 135.  Her previous CMP had a glucose of about 220.  She would like to continue with current management for now before making any adjustments or additional medications.  She will continue metformin.  No refills needed at this time.  Repeat A1c ordered to be done 3 months after her prior.  We will follow-up on control after that point.  She will follow-up sooner for acute concerns or complaints.

## 2023-06-26 NOTE — PATIENT INSTRUCTIONS
Finish the Vitamin D prescription. Then take about 4000 units of vitamin D daily if you can.    Follow up in August, about a week after your next A1c. You can get the next A1c on or after 8/10/23

## 2023-07-13 DIAGNOSIS — E11.65 TYPE 2 DIABETES MELLITUS WITH HYPERGLYCEMIA, WITHOUT LONG-TERM CURRENT USE OF INSULIN (MULTI): ICD-10-CM

## 2023-07-13 RX ORDER — LISINOPRIL 2.5 MG/1
2.5 TABLET ORAL DAILY
Qty: 90 TABLET | Refills: 1 | Status: SHIPPED | OUTPATIENT
Start: 2023-07-13 | End: 2023-12-05 | Stop reason: SDUPTHER

## 2023-08-17 DIAGNOSIS — R92.8 ABNORMAL MAMMOGRAM OF RIGHT BREAST: Primary | ICD-10-CM

## 2023-08-22 ENCOUNTER — LAB (OUTPATIENT)
Dept: LAB | Facility: LAB | Age: 48
End: 2023-08-22
Payer: COMMERCIAL

## 2023-08-22 DIAGNOSIS — E11.65 TYPE 2 DIABETES MELLITUS WITH HYPERGLYCEMIA, WITHOUT LONG-TERM CURRENT USE OF INSULIN (MULTI): ICD-10-CM

## 2023-08-22 DIAGNOSIS — K76.0 FATTY LIVER: ICD-10-CM

## 2023-08-22 PROCEDURE — 36415 COLL VENOUS BLD VENIPUNCTURE: CPT

## 2023-08-22 PROCEDURE — 83036 HEMOGLOBIN GLYCOSYLATED A1C: CPT

## 2023-08-23 LAB
ESTIMATED AVERAGE GLUCOSE FOR HBA1C: 157 MG/DL
HEMOGLOBIN A1C/HEMOGLOBIN TOTAL IN BLOOD: 7.1 %

## 2023-08-28 ENCOUNTER — OFFICE VISIT (OUTPATIENT)
Dept: PRIMARY CARE | Facility: CLINIC | Age: 48
End: 2023-08-28
Payer: COMMERCIAL

## 2023-08-28 VITALS
SYSTOLIC BLOOD PRESSURE: 139 MMHG | BODY MASS INDEX: 37.74 KG/M2 | WEIGHT: 213 LBS | OXYGEN SATURATION: 98 % | HEIGHT: 63 IN | DIASTOLIC BLOOD PRESSURE: 86 MMHG | TEMPERATURE: 97.3 F | HEART RATE: 76 BPM

## 2023-08-28 DIAGNOSIS — R93.3 ABNORMAL COLONOSCOPY: ICD-10-CM

## 2023-08-28 DIAGNOSIS — E11.65 TYPE 2 DIABETES MELLITUS WITH HYPERGLYCEMIA, WITHOUT LONG-TERM CURRENT USE OF INSULIN (MULTI): Primary | ICD-10-CM

## 2023-08-28 DIAGNOSIS — R92.8 ABNORMAL MAMMOGRAM: ICD-10-CM

## 2023-08-28 PROCEDURE — 3075F SYST BP GE 130 - 139MM HG: CPT | Performed by: STUDENT IN AN ORGANIZED HEALTH CARE EDUCATION/TRAINING PROGRAM

## 2023-08-28 PROCEDURE — 4010F ACE/ARB THERAPY RXD/TAKEN: CPT | Performed by: STUDENT IN AN ORGANIZED HEALTH CARE EDUCATION/TRAINING PROGRAM

## 2023-08-28 PROCEDURE — 99214 OFFICE O/P EST MOD 30 MIN: CPT | Performed by: STUDENT IN AN ORGANIZED HEALTH CARE EDUCATION/TRAINING PROGRAM

## 2023-08-28 PROCEDURE — 3051F HG A1C>EQUAL 7.0%<8.0%: CPT | Performed by: STUDENT IN AN ORGANIZED HEALTH CARE EDUCATION/TRAINING PROGRAM

## 2023-08-28 PROCEDURE — 3079F DIAST BP 80-89 MM HG: CPT | Performed by: STUDENT IN AN ORGANIZED HEALTH CARE EDUCATION/TRAINING PROGRAM

## 2023-08-28 PROCEDURE — 1036F TOBACCO NON-USER: CPT | Performed by: STUDENT IN AN ORGANIZED HEALTH CARE EDUCATION/TRAINING PROGRAM

## 2023-08-28 ASSESSMENT — ENCOUNTER SYMPTOMS
FATIGUE: 0
FREQUENCY: 0
NAUSEA: 0
COUGH: 0
RHINORRHEA: 0
FEVER: 0
MYALGIAS: 0
VOMITING: 0
ABDOMINAL PAIN: 0
CONSTIPATION: 0
SHORTNESS OF BREATH: 0
CHILLS: 0
ARTHRALGIAS: 0
DIARRHEA: 0

## 2023-08-28 NOTE — PROGRESS NOTES
"Subjective   Patient ID: Pamela Sexton is a 47 y.o. female who presents for Follow-up.    HPI     Overall she is doing well.  Patient states she is doing really well with her overall diet.  She has cut out pretty much all sugary drinks.  Her average blood sugar in the morning is right between 140-150.  She denies any symptoms or current side effects.  She is not sure if she wants to try any additional medications.    She had her colonoscopy done. They will be calling her for pathology results for polyps. They will determine follow up but will be within 5 years.    She is scheduled for the mammogram repeat and ultrasound on 8/30/2023.  She has been under more stress especially with some of the abnormal findings on her recent mammogram.    Review of Systems   Constitutional:  Negative for chills, fatigue and fever.   HENT:  Negative for congestion and rhinorrhea.    Respiratory:  Negative for cough and shortness of breath.    Cardiovascular:  Negative for chest pain.   Gastrointestinal:  Negative for abdominal pain, constipation, diarrhea, nausea and vomiting.   Genitourinary:  Negative for frequency.   Musculoskeletal:  Negative for arthralgias and myalgias.       Objective   /86   Pulse 76   Temp 36.3 °C (97.3 °F)   Ht 1.6 m (5' 3\")   Wt 96.6 kg (213 lb)   SpO2 98%   BMI 37.73 kg/m²     Physical Exam  Vitals and nursing note reviewed.   Constitutional:       General: She is not in acute distress.     Appearance: Normal appearance. She is obese. She is not ill-appearing or toxic-appearing.   HENT:      Head: Normocephalic and atraumatic.   Cardiovascular:      Rate and Rhythm: Normal rate and regular rhythm.      Heart sounds: Normal heart sounds.   Pulmonary:      Effort: Pulmonary effort is normal.      Breath sounds: Normal breath sounds.   Abdominal:      General: Bowel sounds are normal.      Palpations: Abdomen is soft.   Neurological:      Mental Status: She is alert.         Assessment/Plan "   Problem List Items Addressed This Visit       Type 2 diabetes mellitus with hyperglycemia, without long-term current use of insulin (CMS/[facility]) - Primary    Relevant Medications    semaglutide 0.25 mg or 0.5 mg (2 mg/3 mL) pen injector    Other Relevant Orders    Hemoglobin A1C    Lipid Panel    Comprehensive Metabolic Panel     Other Visit Diagnoses       Abnormal mammogram        Abnormal colonoscopy              History and physical examination as above.  Patient overall doing well with her diabetic diet and follow-up.  A1c significantly improved.  Plan for repeat A1c and 3 months.  We will also repeat lipid panel and metabolic panel at that time.  Discussed medication risks, benefits and side effects of starting on Ozempic in addition for benefits for possible weight loss as patient continues with her diet and increase his exercise.  She is agreeable to starting on medication.  We will start at initial dose of 0.25 mg for the next 6 weeks.  She will call for refill.  Discussed to call if she runs into issues getting the medication filled.  She will continue with follow-up for her mammogram and we will contact her in regards to results.  She will call if she does not hear back within 1 week.  Instructed patient to keep in contact with GI about when she will be due for her next colonoscopy.  She is understanding and in agreement with this plan.

## 2023-08-28 NOTE — PATIENT INSTRUCTIONS
Thank you for coming in for your appointment.    You are doing really well with your diabetic control.  Please continue to keep up healthy diet and exercise as discussed.  We will try starting you on Ozempic as this should help with diabetic control in addition to weight loss.  You can continue on your current dose of metformin for now.  If your glucose levels start to improve in the early mornings, you can go down to taking 3 tablets of metformin daily and then possibly down to only twice a day.  Please let me know when making any of these changes.    Please call if the Ozempic medication ends up being fairly expensive.  There may be other options available.  The injectable medication with Ozempic is once a week.  We will start with giving you 6 weeks worth of the medication.  Please call for refills.    Please get labs done on or after 11/24/2023.  We will be repeating the cholesterol levels, the hemoglobin A1c and a metabolic panel.    Please keep track of blood pressures at home.  If they are elevated, we can adjust the dose of your lisinopril medication.    We can plan on a follow-up in about 3 months after you get your lab work done.  You can set this appointment up today.    Please call with any other questions or concerns.    Thank you

## 2023-10-30 DIAGNOSIS — E11.65 TYPE 2 DIABETES MELLITUS WITH HYPERGLYCEMIA, WITHOUT LONG-TERM CURRENT USE OF INSULIN (MULTI): ICD-10-CM

## 2023-11-05 DIAGNOSIS — E11.65 TYPE 2 DIABETES MELLITUS WITH HYPERGLYCEMIA, WITHOUT LONG-TERM CURRENT USE OF INSULIN (MULTI): Primary | ICD-10-CM

## 2023-11-05 RX ORDER — SEMAGLUTIDE 0.68 MG/ML
INJECTION, SOLUTION SUBCUTANEOUS
Qty: 3 ML | Refills: 0 | OUTPATIENT
Start: 2023-11-05

## 2023-11-30 ENCOUNTER — LAB (OUTPATIENT)
Dept: LAB | Facility: LAB | Age: 48
End: 2023-11-30
Payer: COMMERCIAL

## 2023-11-30 DIAGNOSIS — E11.65 TYPE 2 DIABETES MELLITUS WITH HYPERGLYCEMIA, WITHOUT LONG-TERM CURRENT USE OF INSULIN (MULTI): ICD-10-CM

## 2023-11-30 PROCEDURE — 80053 COMPREHEN METABOLIC PANEL: CPT

## 2023-11-30 PROCEDURE — 36415 COLL VENOUS BLD VENIPUNCTURE: CPT

## 2023-11-30 PROCEDURE — 80061 LIPID PANEL: CPT

## 2023-11-30 PROCEDURE — 83036 HEMOGLOBIN GLYCOSYLATED A1C: CPT

## 2023-12-01 LAB
ALBUMIN SERPL BCP-MCNC: 4.6 G/DL (ref 3.4–5)
ALP SERPL-CCNC: 56 U/L (ref 33–110)
ALT SERPL W P-5'-P-CCNC: 42 U/L (ref 7–45)
ANION GAP SERPL CALC-SCNC: 17 MMOL/L (ref 10–20)
AST SERPL W P-5'-P-CCNC: 26 U/L (ref 9–39)
BILIRUB SERPL-MCNC: 0.5 MG/DL (ref 0–1.2)
BUN SERPL-MCNC: 11 MG/DL (ref 6–23)
CALCIUM SERPL-MCNC: 9.6 MG/DL (ref 8.6–10.6)
CHLORIDE SERPL-SCNC: 102 MMOL/L (ref 98–107)
CHOLEST SERPL-MCNC: 153 MG/DL (ref 0–199)
CHOLESTEROL/HDL RATIO: 3.2
CO2 SERPL-SCNC: 23 MMOL/L (ref 21–32)
CREAT SERPL-MCNC: 0.85 MG/DL (ref 0.5–1.05)
EST. AVERAGE GLUCOSE BLD GHB EST-MCNC: 131 MG/DL
GFR SERPL CREATININE-BSD FRML MDRD: 86 ML/MIN/1.73M*2
GLUCOSE SERPL-MCNC: 94 MG/DL (ref 74–99)
HBA1C MFR BLD: 6.2 %
HDLC SERPL-MCNC: 47.9 MG/DL
LDLC SERPL CALC-MCNC: 76 MG/DL
NON HDL CHOLESTEROL: 105 MG/DL (ref 0–149)
POTASSIUM SERPL-SCNC: 4 MMOL/L (ref 3.5–5.3)
PROT SERPL-MCNC: 7.7 G/DL (ref 6.4–8.2)
SODIUM SERPL-SCNC: 138 MMOL/L (ref 136–145)
TRIGL SERPL-MCNC: 145 MG/DL (ref 0–149)
VLDL: 29 MG/DL (ref 0–40)

## 2023-12-05 ENCOUNTER — OFFICE VISIT (OUTPATIENT)
Dept: PRIMARY CARE | Facility: CLINIC | Age: 48
End: 2023-12-05
Payer: COMMERCIAL

## 2023-12-05 VITALS
HEART RATE: 83 BPM | OXYGEN SATURATION: 98 % | BODY MASS INDEX: 36.32 KG/M2 | DIASTOLIC BLOOD PRESSURE: 77 MMHG | TEMPERATURE: 97.1 F | WEIGHT: 205 LBS | SYSTOLIC BLOOD PRESSURE: 120 MMHG | HEIGHT: 63 IN

## 2023-12-05 DIAGNOSIS — E11.65 TYPE 2 DIABETES MELLITUS WITH HYPERGLYCEMIA, WITHOUT LONG-TERM CURRENT USE OF INSULIN (MULTI): Primary | ICD-10-CM

## 2023-12-05 PROCEDURE — 3074F SYST BP LT 130 MM HG: CPT | Performed by: STUDENT IN AN ORGANIZED HEALTH CARE EDUCATION/TRAINING PROGRAM

## 2023-12-05 PROCEDURE — 3078F DIAST BP <80 MM HG: CPT | Performed by: STUDENT IN AN ORGANIZED HEALTH CARE EDUCATION/TRAINING PROGRAM

## 2023-12-05 PROCEDURE — 3048F LDL-C <100 MG/DL: CPT | Performed by: STUDENT IN AN ORGANIZED HEALTH CARE EDUCATION/TRAINING PROGRAM

## 2023-12-05 PROCEDURE — 4010F ACE/ARB THERAPY RXD/TAKEN: CPT | Performed by: STUDENT IN AN ORGANIZED HEALTH CARE EDUCATION/TRAINING PROGRAM

## 2023-12-05 PROCEDURE — 3044F HG A1C LEVEL LT 7.0%: CPT | Performed by: STUDENT IN AN ORGANIZED HEALTH CARE EDUCATION/TRAINING PROGRAM

## 2023-12-05 PROCEDURE — 99214 OFFICE O/P EST MOD 30 MIN: CPT | Performed by: STUDENT IN AN ORGANIZED HEALTH CARE EDUCATION/TRAINING PROGRAM

## 2023-12-05 PROCEDURE — 1036F TOBACCO NON-USER: CPT | Performed by: STUDENT IN AN ORGANIZED HEALTH CARE EDUCATION/TRAINING PROGRAM

## 2023-12-05 RX ORDER — METFORMIN HYDROCHLORIDE 500 MG/1
1000 TABLET, EXTENDED RELEASE ORAL
Qty: 360 TABLET | Refills: 1 | Status: SHIPPED | OUTPATIENT
Start: 2023-12-05 | End: 2024-06-02

## 2023-12-05 RX ORDER — LISINOPRIL 2.5 MG/1
2.5 TABLET ORAL DAILY
Qty: 90 TABLET | Refills: 1 | Status: SHIPPED | OUTPATIENT
Start: 2023-12-05 | End: 2024-06-02

## 2023-12-05 ASSESSMENT — ENCOUNTER SYMPTOMS
VOMITING: 0
ABDOMINAL PAIN: 0
FATIGUE: 0
SHORTNESS OF BREATH: 0
NAUSEA: 0
FEVER: 0
COUGH: 0
CONSTIPATION: 0
DIARRHEA: 0
MYALGIAS: 0
FREQUENCY: 0
RHINORRHEA: 0
ARTHRALGIAS: 0
CHILLS: 0

## 2023-12-05 ASSESSMENT — PATIENT HEALTH QUESTIONNAIRE - PHQ9
2. FEELING DOWN, DEPRESSED OR HOPELESS: NOT AT ALL
SUM OF ALL RESPONSES TO PHQ9 QUESTIONS 1 AND 2: 0
1. LITTLE INTEREST OR PLEASURE IN DOING THINGS: NOT AT ALL

## 2023-12-05 NOTE — PROGRESS NOTES
"Subjective   Patient ID: Pamela Sexton is a 47 y.o. female who presents for Follow-up.    HPI     Overall doing well.  Still having some nausea and some bloating.  This started when she increased the dose of the Ozempic.  Patient is otherwise doing well.  She has been following her diabetic diet very well.  She has also noticed some moderate weight loss.  She would like to continue with her current medication doses.    Review of Systems   Constitutional:  Negative for chills, fatigue and fever.   HENT:  Negative for congestion and rhinorrhea.    Respiratory:  Negative for cough and shortness of breath.    Cardiovascular:  Negative for chest pain.   Gastrointestinal:  Negative for abdominal pain, constipation, diarrhea, nausea and vomiting.   Genitourinary:  Negative for frequency.   Musculoskeletal:  Negative for arthralgias and myalgias.       Objective   /77   Pulse 83   Temp 36.2 °C (97.1 °F)   Ht 1.6 m (5' 3\")   Wt 93 kg (205 lb)   SpO2 98%   BMI 36.31 kg/m²     Physical Exam  Vitals and nursing note reviewed.   Constitutional:       General: She is not in acute distress.     Appearance: Normal appearance. She is obese. She is not ill-appearing or toxic-appearing.   HENT:      Head: Normocephalic and atraumatic.   Cardiovascular:      Rate and Rhythm: Normal rate and regular rhythm.      Heart sounds: Normal heart sounds.   Pulmonary:      Effort: Pulmonary effort is normal.      Breath sounds: Normal breath sounds.   Abdominal:      General: Bowel sounds are normal.      Palpations: Abdomen is soft.   Neurological:      Mental Status: She is alert.         Assessment/Plan   Problem List Items Addressed This Visit             ICD-10-CM    Type 2 diabetes mellitus with hyperglycemia, without long-term current use of insulin (CMS/[facility]) - Primary E11.65    Relevant Medications    metFORMIN  mg 24 hr tablet    lisinopril 2.5 mg tablet    semaglutide 0.25 mg or 0.5 mg (2 mg/3 mL) pen injector    " Other Relevant Orders    Hemoglobin A1C     History and physical examination as above.  Patient presenting for follow-up for her diabetes.  Patient has been having excellent response with adjusting her diet, adding exercise and compliance with her medications.  She is also been noticing some moderate weight loss on the Ozempic.  Refilled medications for 6-month supply.  Sent in an additional month prescription for the Ozempic at the current dose.  Patient will call as long as her side effects are not worsening, we can continue at the same dose of medication.  Next A1c planned for March 2024.  We will plan on seeing the patient back for a wellness visit in June 2024.  Patient will call ahead for lab work orders.  She will call sooner for acute concerns or complaints.

## 2023-12-05 NOTE — PATIENT INSTRUCTIONS
Please continue to keep up all the good work with your healthy diet and exercise.      I sent in a 6-month supply of the lisinopril and metformin.  I sent in only 1 more pen of the Ozempic to see how you do with any sort of side effects.  Let me know how you are feeling towards the end of this next month of being on the medication.  If you still feel like everything is manageable.  That I can go ahead and send in for the same dose of the 0.5 mg with refills.  If he would like to go back down to the point to 5 mg, please let me know.    Please go ahead and get another A1c on or after 3/1/2024.  We will follow-up if this is significantly changed.    Please get scheduled for a wellness visit in June 2024.  We can discuss further findings at that time.    Please call sooner with any other acute concerns or complaints.    Thank you

## 2024-01-20 DIAGNOSIS — E11.65 TYPE 2 DIABETES MELLITUS WITH HYPERGLYCEMIA, WITHOUT LONG-TERM CURRENT USE OF INSULIN (MULTI): ICD-10-CM

## 2024-01-20 RX ORDER — SEMAGLUTIDE 0.68 MG/ML
INJECTION, SOLUTION SUBCUTANEOUS
Qty: 3 ML | Refills: 2 | Status: SHIPPED | OUTPATIENT
Start: 2024-01-20 | End: 2024-05-07

## 2024-02-29 ENCOUNTER — HOSPITAL ENCOUNTER (OUTPATIENT)
Dept: RADIOLOGY | Facility: CLINIC | Age: 49
Discharge: HOME | End: 2024-02-29
Payer: COMMERCIAL

## 2024-02-29 DIAGNOSIS — R92.8 ABNORMAL MAMMOGRAM OF RIGHT BREAST: ICD-10-CM

## 2024-02-29 PROCEDURE — 77061 BREAST TOMOSYNTHESIS UNI: CPT | Mod: RT

## 2024-02-29 PROCEDURE — 77065 DX MAMMO INCL CAD UNI: CPT | Mod: RIGHT SIDE | Performed by: RADIOLOGY

## 2024-02-29 PROCEDURE — 77061 BREAST TOMOSYNTHESIS UNI: CPT | Mod: RIGHT SIDE | Performed by: RADIOLOGY

## 2024-03-06 ENCOUNTER — LAB (OUTPATIENT)
Dept: LAB | Facility: LAB | Age: 49
End: 2024-03-06
Payer: COMMERCIAL

## 2024-03-06 DIAGNOSIS — E11.65 TYPE 2 DIABETES MELLITUS WITH HYPERGLYCEMIA, WITHOUT LONG-TERM CURRENT USE OF INSULIN (MULTI): ICD-10-CM

## 2024-03-06 PROCEDURE — 83036 HEMOGLOBIN GLYCOSYLATED A1C: CPT

## 2024-03-06 PROCEDURE — 36415 COLL VENOUS BLD VENIPUNCTURE: CPT

## 2024-03-07 LAB
EST. AVERAGE GLUCOSE BLD GHB EST-MCNC: 117 MG/DL
HBA1C MFR BLD: 5.7 %

## 2024-05-04 DIAGNOSIS — E11.65 TYPE 2 DIABETES MELLITUS WITH HYPERGLYCEMIA, WITHOUT LONG-TERM CURRENT USE OF INSULIN (MULTI): ICD-10-CM

## 2024-05-07 RX ORDER — SEMAGLUTIDE 0.68 MG/ML
INJECTION, SOLUTION SUBCUTANEOUS
Qty: 3 ML | Refills: 2 | Status: SHIPPED | OUTPATIENT
Start: 2024-05-07

## 2024-06-10 DIAGNOSIS — E11.65 TYPE 2 DIABETES MELLITUS WITH HYPERGLYCEMIA, WITHOUT LONG-TERM CURRENT USE OF INSULIN (MULTI): ICD-10-CM

## 2024-06-11 ENCOUNTER — APPOINTMENT (OUTPATIENT)
Dept: PRIMARY CARE | Facility: CLINIC | Age: 49
End: 2024-06-11
Payer: COMMERCIAL

## 2024-06-11 ENCOUNTER — LAB (OUTPATIENT)
Dept: LAB | Facility: LAB | Age: 49
End: 2024-06-11
Payer: COMMERCIAL

## 2024-06-11 VITALS
WEIGHT: 204 LBS | BODY MASS INDEX: 36.14 KG/M2 | HEIGHT: 63 IN | OXYGEN SATURATION: 96 % | TEMPERATURE: 98 F | HEART RATE: 80 BPM | DIASTOLIC BLOOD PRESSURE: 72 MMHG | SYSTOLIC BLOOD PRESSURE: 105 MMHG

## 2024-06-11 DIAGNOSIS — Z12.31 BREAST CANCER SCREENING BY MAMMOGRAM: ICD-10-CM

## 2024-06-11 DIAGNOSIS — E11.65 TYPE 2 DIABETES MELLITUS WITH HYPERGLYCEMIA, WITHOUT LONG-TERM CURRENT USE OF INSULIN (MULTI): ICD-10-CM

## 2024-06-11 DIAGNOSIS — Z00.00 ENCOUNTER FOR WELLNESS EXAMINATION IN ADULT: Primary | ICD-10-CM

## 2024-06-11 LAB
EST. AVERAGE GLUCOSE BLD GHB EST-MCNC: 120 MG/DL
HBA1C MFR BLD: 5.8 %

## 2024-06-11 PROCEDURE — 83036 HEMOGLOBIN GLYCOSYLATED A1C: CPT

## 2024-06-11 PROCEDURE — 36415 COLL VENOUS BLD VENIPUNCTURE: CPT

## 2024-06-11 PROCEDURE — 99396 PREV VISIT EST AGE 40-64: CPT | Performed by: STUDENT IN AN ORGANIZED HEALTH CARE EDUCATION/TRAINING PROGRAM

## 2024-06-11 PROCEDURE — 3078F DIAST BP <80 MM HG: CPT | Performed by: STUDENT IN AN ORGANIZED HEALTH CARE EDUCATION/TRAINING PROGRAM

## 2024-06-11 PROCEDURE — 3044F HG A1C LEVEL LT 7.0%: CPT | Performed by: STUDENT IN AN ORGANIZED HEALTH CARE EDUCATION/TRAINING PROGRAM

## 2024-06-11 PROCEDURE — 3074F SYST BP LT 130 MM HG: CPT | Performed by: STUDENT IN AN ORGANIZED HEALTH CARE EDUCATION/TRAINING PROGRAM

## 2024-06-11 RX ORDER — BISMUTH SUBSALICYLATE 262 MG
1 TABLET,CHEWABLE ORAL DAILY
COMMUNITY

## 2024-06-11 ASSESSMENT — ENCOUNTER SYMPTOMS
PALPITATIONS: 0
FEVER: 0
NAUSEA: 0
SHORTNESS OF BREATH: 0
NUMBNESS: 0
DIZZINESS: 0
CONSTIPATION: 0
LIGHT-HEADEDNESS: 0
CHILLS: 0
ABDOMINAL PAIN: 0
MYALGIAS: 0
FREQUENCY: 0
DYSPHORIC MOOD: 0
DIARRHEA: 0
COUGH: 0
FATIGUE: 0
COLOR CHANGE: 0
ARTHRALGIAS: 0
DYSURIA: 0
RHINORRHEA: 0
VOMITING: 0
NERVOUS/ANXIOUS: 0

## 2024-06-11 ASSESSMENT — PATIENT HEALTH QUESTIONNAIRE - PHQ9
2. FEELING DOWN, DEPRESSED OR HOPELESS: NOT AT ALL
1. LITTLE INTEREST OR PLEASURE IN DOING THINGS: NOT AT ALL
SUM OF ALL RESPONSES TO PHQ9 QUESTIONS 1 AND 2: 0

## 2024-06-11 NOTE — PATIENT INSTRUCTIONS
Thank you for coming in for your annual wellness examination.    Excellent work with management of your diabetes at home.  Keep up all the good work with healthy diet and regular exercise.  Please call for any medication refills that we need.    I will plan on an A1c in the next 3 months.  We will plan on a follow-up visit in 6 months.  Please get that appointment scheduled before you leave here today.    I did go ahead and place order for bilateral mammogram screening.  This can be done on or after August 30 of this year.  Please call us in the next week if you do not hear from them about getting scheduled.    If you would like to try a 3-month supply of the Ozempic to your mail order pharmacy, please call them first just to make sure and depending on the cost.  We can send in a prescription to your pharmacy if you will need.  Please send a message with the medication dosing and where you would like it sent.    Please keep up all the good work.  Please call sooner with any other acute concerns or complaints.    Thank you

## 2024-06-11 NOTE — PROGRESS NOTES
"Subjective   Patient ID: Pamela Sexton is a 48 y.o. female who presents for Annual Exam.    HPI     No acute concerns or complaints today.    Dental: Twice yearly exams.  Vision: Corrective lenses with glasses. Yearly exams.    Diet: Well balanced diet. Diabetic diet.  Exercise: Yard work.   Caffeine: Coffee maybe 1 cup daily.  Fluids: Well hydrated.  Supplements: Multivitamin    Tobacco: None. Previously smoked for 3 years. Back in high school.  Alcohol: Rarely. 2-3 times a year.  Recreational Drugs: None    Last Colonoscopy: 08/15/2023. Follow up in 5 years in 2028.  Last Pap smear: Following with Ob/Gyn January 2024. Had pelvic ultrasound done in March 2024.  Mammogram: 02/29/2024. Recommended regular mammogram in 6 months in 08/2024. Order placed.  Low Dose Lung CT Screening: Not indicated.  AAA Screening: Not indicated.    Influenza: Up to date.  Tetanus: Recommended if injury.  COVID: Recommended updated vaccine.    Review of Systems   Constitutional:  Negative for chills, fatigue and fever.   HENT:  Negative for congestion and rhinorrhea.    Eyes:  Negative for visual disturbance.   Respiratory:  Negative for cough and shortness of breath.    Cardiovascular:  Negative for chest pain and palpitations.   Gastrointestinal:  Negative for abdominal pain, constipation, diarrhea, nausea and vomiting.   Genitourinary:  Negative for dysuria and frequency.   Musculoskeletal:  Negative for arthralgias and myalgias.   Skin:  Negative for color change and rash.   Neurological:  Negative for dizziness, light-headedness and numbness.   Psychiatric/Behavioral:  Negative for dysphoric mood. The patient is not nervous/anxious.        Objective   /72   Pulse 80   Temp 36.7 °C (98 °F)   Ht 1.6 m (5' 3\")   Wt 92.5 kg (204 lb)   SpO2 96%   BMI 36.14 kg/m²   BSA Body surface area is 2.03 meters squared.      Physical Exam  Vitals and nursing note reviewed.   Constitutional:       General: She is not in acute " distress.     Appearance: Normal appearance. She is normal weight. She is not ill-appearing or toxic-appearing.   HENT:      Head: Normocephalic and atraumatic.      Right Ear: Tympanic membrane, ear canal and external ear normal.      Left Ear: Tympanic membrane, ear canal and external ear normal.      Nose: Nose normal.      Mouth/Throat:      Mouth: Mucous membranes are moist.   Eyes:      Extraocular Movements: Extraocular movements intact.      Conjunctiva/sclera: Conjunctivae normal.      Pupils: Pupils are equal, round, and reactive to light.   Cardiovascular:      Rate and Rhythm: Normal rate and regular rhythm.      Pulses: Normal pulses.      Heart sounds: Normal heart sounds.   Pulmonary:      Effort: Pulmonary effort is normal.      Breath sounds: Normal breath sounds.   Abdominal:      General: Abdomen is flat. Bowel sounds are normal.      Palpations: Abdomen is soft.   Musculoskeletal:         General: Normal range of motion.      Cervical back: Normal range of motion and neck supple.   Skin:     General: Skin is warm.   Neurological:      General: No focal deficit present.      Mental Status: She is alert and oriented to person, place, and time. Mental status is at baseline.      Cranial Nerves: No cranial nerve deficit.      Sensory: No sensory deficit.   Psychiatric:         Mood and Affect: Mood normal.         Behavior: Behavior normal.         Thought Content: Thought content normal.         Judgment: Judgment normal.       Lab on 06/11/2024   Component Date Value Ref Range Status    Hemoglobin A1C 06/11/2024 5.8 (H)  see below % Final    Estimated Average Glucose 06/11/2024 120  Not Established mg/dL Final   Lab on 03/06/2024   Component Date Value Ref Range Status    Hemoglobin A1C 03/06/2024 5.7 (H)  see below % Final    Estimated Average Glucose 03/06/2024 117  Not Established mg/dL Final   Lab on 11/30/2023   Component Date Value Ref Range Status    Hemoglobin A1C 11/30/2023 6.2 (H)  see  below % Final    Estimated Average Glucose 11/30/2023 131  Not Established mg/dL Final    Cholesterol 11/30/2023 153  0 - 199 mg/dL Final          Age      Desirable   Borderline High   High     0-19 Y     0 - 169       170 - 199     >/= 200    20-24 Y     0 - 189       190 - 224     >/= 225         >24 Y     0 - 199       200 - 239     >/= 240   **All ranges are based on fasting samples. Specific   therapeutic targets will vary based on patient-specific   cardiac risk.    Pediatric guidelines reference:Pediatrics 2011, 128(S5).Adult guidelines reference: NCEP ATPIII Guidelines,PRIYANKA 2001, 258:2486-97    Venipuncture immediately after or during the administration of Metamizole may lead to falsely low results. Testing should be performed immediately prior to Metamizole dosing.    HDL-Cholesterol 11/30/2023 47.9  mg/dL Final      Age       Very Low   Low     Normal    High    0-19 Y    < 35      < 40     40-45     ----  20-24 Y    ----     < 40      >45      ----        >24 Y      ----     < 40     40-60      >60      Cholesterol/HDL Ratio 11/30/2023 3.2   Final      Ref Values  Desirable  < 3.4  High Risk  > 5.0    LDL Calculated 11/30/2023 76  <=99 mg/dL Final                                Near   Borderline      AGE      Desirable  Optimal    High     High     Very High     0-19 Y     0 - 109     ---    110-129   >/= 130     ----    20-24 Y     0 - 119     ---    120-159   >/= 160     ----      >24 Y     0 -  99   100-129  130-159   160-189     >/=190      VLDL 11/30/2023 29  0 - 40 mg/dL Final    Triglycerides 11/30/2023 145  0 - 149 mg/dL Final       Age         Desirable   Borderline High   High     Very High   0 D-90 D    19 - 174         ----         ----        ----  91 D- 9 Y     0 -  74        75 -  99     >/= 100      ----    10-19 Y     0 -  89        90 - 129     >/= 130      ----    20-24 Y     0 - 114       115 - 149     >/= 150      ----         >24 Y     0 - 149       150 - 199    200- 499    >/=  500    Venipuncture immediately after or during the administration of Metamizole may lead to falsely low results. Testing should be performed immediately prior to Metamizole dosing.    Non HDL Cholesterol 11/30/2023 105  0 - 149 mg/dL Final          Age       Desirable   Borderline High   High     Very High     0-19 Y     0 - 119       120 - 144     >/= 145    >/= 160    20-24 Y     0 - 149       150 - 189     >/= 190      ----         >24 Y    30 mg/dL above LDL Cholesterol goal      Glucose 11/30/2023 94  74 - 99 mg/dL Final    Sodium 11/30/2023 138  136 - 145 mmol/L Final    Potassium 11/30/2023 4.0  3.5 - 5.3 mmol/L Final    Chloride 11/30/2023 102  98 - 107 mmol/L Final    Bicarbonate 11/30/2023 23  21 - 32 mmol/L Final    Anion Gap 11/30/2023 17  10 - 20 mmol/L Final    Urea Nitrogen 11/30/2023 11  6 - 23 mg/dL Final    Creatinine 11/30/2023 0.85  0.50 - 1.05 mg/dL Final    eGFR 11/30/2023 86  >60 mL/min/1.73m*2 Final    Calculations of estimated GFR are performed using the 2021 CKD-EPI Study Refit equation without the race variable for the IDMS-Traceable creatinine methods.  https://jasn.asnjournals.org/content/early/2021/09/22/ASN.5479816558    Calcium 11/30/2023 9.6  8.6 - 10.6 mg/dL Final    Albumin 11/30/2023 4.6  3.4 - 5.0 g/dL Final    Alkaline Phosphatase 11/30/2023 56  33 - 110 U/L Final    Total Protein 11/30/2023 7.7  6.4 - 8.2 g/dL Final    AST 11/30/2023 26  9 - 39 U/L Final    Bilirubin, Total 11/30/2023 0.5  0.0 - 1.2 mg/dL Final    ALT 11/30/2023 42  7 - 45 U/L Final    Patients treated with Sulfasalazine may generate falsely decreased results for ALT.   Lab on 08/22/2023   Component Date Value Ref Range Status    Hemoglobin A1C 08/22/2023 7.1 (A)  % Final         Diagnosis of Diabetes-Adults   Non-Diabetic: < or = 5.6%   Increased risk for developing diabetes: 5.7-6.4%   Diagnostic of diabetes: > or = 6.5%  .       Monitoring of Diabetes                Age (y)     Therapeutic Goal (%)    Adults:          >18           <7.0   Pediatrics:    13-18           <7.5                   7-12           <8.0                   0- 6            7.5-8.5   American Diabetes Association. Diabetes Care 33(S1), Jan 2010.    Estimated Average Glucose 08/22/2023 157  MG/DL Final   Lab on 06/20/2023   Component Date Value Ref Range Status    Vitamin D, 25-Hydroxy 06/20/2023 46  ng/mL Final    .  DEFICIENCY:         < 20   NG/ML  INSUFFICIENCY:      20-29  NG/ML  SUFFICIENCY:         NG/ML    THIS ASSAY ACCURATELY QUANTIFIES THE SUM OF  VITAMIN D3, 25-HYDROXY AND VIT D2,25-HYDROXY.    Glucose 06/20/2023 135 (H)  74 - 99 mg/dL Final    Sodium 06/20/2023 138  136 - 145 mmol/L Final    Potassium 06/20/2023 3.9  3.5 - 5.3 mmol/L Final    Chloride 06/20/2023 103  98 - 107 mmol/L Final    Bicarbonate 06/20/2023 27  21 - 32 mmol/L Final    Anion Gap 06/20/2023 12  10 - 20 mmol/L Final    Urea Nitrogen 06/20/2023 13  6 - 23 mg/dL Final    Creatinine 06/20/2023 0.69  0.50 - 1.05 mg/dL Final    GFR Female 06/20/2023 >90  >90 mL/min/1.73m2 Final     CALCULATIONS OF ESTIMATED GFR ARE PERFORMED   USING THE 2021 CKD-EPI STUDY REFIT EQUATION   WITHOUT THE RACE VARIABLE FOR THE IDMS-TRACEABLE   CREATININE METHODS.    https://jasn.asnjournals.org/content/early/2021/09/22/ASN.9343255404    Calcium 06/20/2023 9.6  8.6 - 10.6 mg/dL Final    Albumin 06/20/2023 4.5  3.4 - 5.0 g/dL Final    Alkaline Phosphatase 06/20/2023 96  33 - 110 U/L Final    Total Protein 06/20/2023 7.6  6.4 - 8.2 g/dL Final    AST 06/20/2023 28  9 - 39 U/L Final    Total Bilirubin 06/20/2023 0.3  0.0 - 1.2 mg/dL Final    ALT (SGPT) 06/20/2023 59 (H)  7 - 45 U/L Final     Patients treated with Sulfasalazine may generate    falsely decreased results for ALT.    WBC 06/20/2023 12.1 (H)  4.4 - 11.3 x10E9/L Final    nRBC 06/20/2023 0.0  0.0 - 0.0 /100 WBC Final    RBC 06/20/2023 5.03  4.00 - 5.20 x10E12/L Final    Hemoglobin 06/20/2023 14.2  12.0 - 16.0 g/dL Final     Hematocrit 06/20/2023 44.3  36.0 - 46.0 % Final    MCV 06/20/2023 88  80 - 100 fL Final    MCHC 06/20/2023 32.1  32.0 - 36.0 g/dL Final    Platelets 06/20/2023 382  150 - 450 x10E9/L Final    RDW 06/20/2023 13.2  11.5 - 14.5 % Final    Neutrophils % 06/20/2023 63.8  40.0 - 80.0 % Final    Immature Granulocytes %, Automated 06/20/2023 0.5  0.0 - 0.9 % Final     Immature Granulocyte Count (IG) includes promyelocytes,    myelocytes and metamyelocytes but does not include bands.   Percent differential counts (%) should be interpreted in the   context of the absolute cell counts (cells/L).    Lymphocytes % 06/20/2023 25.6  13.0 - 44.0 % Final    Monocytes % 06/20/2023 5.4  2.0 - 10.0 % Final    Eosinophils % 06/20/2023 4.0  0.0 - 6.0 % Final    Basophils % 06/20/2023 0.7  0.0 - 2.0 % Final    Neutrophils Absolute 06/20/2023 7.75 (H)  1.20 - 7.70 x10E9/L Final    Lymphocytes Absolute 06/20/2023 3.11  1.20 - 4.80 x10E9/L Final    Monocytes Absolute 06/20/2023 0.65  0.10 - 1.00 x10E9/L Final    Eosinophils Absolute 06/20/2023 0.49  0.00 - 0.70 x10E9/L Final    Basophils Absolute 06/20/2023 0.08  0.00 - 0.10 x10E9/L Final     Current Outpatient Medications on File Prior to Visit   Medication Sig Dispense Refill    multivitamin tablet Take 1 tablet by mouth once daily.      Ozempic 0.25 mg or 0.5 mg (2 mg/3 mL) pen injector Inject 0.5 mg under the skin 1 (one) time per week. 3 mL 2    lisinopril 2.5 mg tablet Take 1 tablet (2.5 mg) by mouth once daily. 90 tablet 1    metFORMIN  mg 24 hr tablet Take 2 tablets (1,000 mg) by mouth 2 times a day with meals. Do not crush, chew, or split. 360 tablet 1     No current facility-administered medications on file prior to visit.     No images are attached to the encounter.        Assessment/Plan   Problem List Items Addressed This Visit             ICD-10-CM    Encounter for wellness examination in adult - Primary Z00.00     Other Visit Diagnoses         Codes    Breast cancer  screening by mammogram     Z12.31    Relevant Orders    BI mammo bilateral screening tomosynthesis          History and physical examination as above.  Patient presenting for annual wellness exam.  No acute concerns or complaints today.  Patient has been doing really well with management of her diabetes at home.  She will continue with healthy diet and regular exercise.  She will call for medication refills.  Plan for A1c in the next 3 months as well as a follow-up visit in 6 months.  Mammogram screening ordered.  Discussed multiple aspects of health including healthy diet and exercise, regular dental and vision care, caffeine use, hydration, supplementation, substance use, screening tests and immunizations.  Recommendations given as documented above.  Patient will continue with follow-up in 6 months.  Lab work to be ordered in 3 months.

## 2024-07-19 DIAGNOSIS — E11.65 TYPE 2 DIABETES MELLITUS WITH HYPERGLYCEMIA, WITHOUT LONG-TERM CURRENT USE OF INSULIN (MULTI): ICD-10-CM

## 2024-07-21 RX ORDER — METFORMIN HYDROCHLORIDE 500 MG/1
TABLET, EXTENDED RELEASE ORAL
Qty: 360 TABLET | Refills: 1 | Status: SHIPPED | OUTPATIENT
Start: 2024-07-21

## 2024-07-21 RX ORDER — LISINOPRIL 2.5 MG/1
2.5 TABLET ORAL DAILY
Qty: 90 TABLET | Refills: 1 | Status: SHIPPED | OUTPATIENT
Start: 2024-07-21 | End: 2025-01-17

## 2024-08-06 DIAGNOSIS — E11.65 TYPE 2 DIABETES MELLITUS WITH HYPERGLYCEMIA, WITHOUT LONG-TERM CURRENT USE OF INSULIN (MULTI): ICD-10-CM

## 2024-08-06 RX ORDER — SEMAGLUTIDE 0.68 MG/ML
INJECTION, SOLUTION SUBCUTANEOUS
Qty: 3 ML | Refills: 2 | Status: SHIPPED | OUTPATIENT
Start: 2024-08-06

## 2024-09-12 ENCOUNTER — LAB (OUTPATIENT)
Dept: LAB | Facility: LAB | Age: 49
End: 2024-09-12
Payer: COMMERCIAL

## 2024-09-12 ENCOUNTER — HOSPITAL ENCOUNTER (OUTPATIENT)
Dept: RADIOLOGY | Facility: CLINIC | Age: 49
Discharge: HOME | End: 2024-09-12
Payer: COMMERCIAL

## 2024-09-12 VITALS — WEIGHT: 204 LBS | HEIGHT: 63 IN | BODY MASS INDEX: 36.14 KG/M2

## 2024-09-12 DIAGNOSIS — E11.65 TYPE 2 DIABETES MELLITUS WITH HYPERGLYCEMIA, WITHOUT LONG-TERM CURRENT USE OF INSULIN (MULTI): ICD-10-CM

## 2024-09-12 DIAGNOSIS — R92.8 ABNORMAL MAMMOGRAM: Primary | ICD-10-CM

## 2024-09-12 DIAGNOSIS — Z12.31 BREAST CANCER SCREENING BY MAMMOGRAM: ICD-10-CM

## 2024-09-12 LAB
EST. AVERAGE GLUCOSE BLD GHB EST-MCNC: 131 MG/DL
HBA1C MFR BLD: 6.2 %

## 2024-09-12 PROCEDURE — 83036 HEMOGLOBIN GLYCOSYLATED A1C: CPT

## 2024-09-12 PROCEDURE — 36415 COLL VENOUS BLD VENIPUNCTURE: CPT

## 2024-09-17 ENCOUNTER — HOSPITAL ENCOUNTER (OUTPATIENT)
Dept: RADIOLOGY | Facility: CLINIC | Age: 49
Discharge: HOME | End: 2024-09-17
Payer: COMMERCIAL

## 2024-09-17 VITALS — BODY MASS INDEX: 36.86 KG/M2 | WEIGHT: 208 LBS | HEIGHT: 63 IN

## 2024-09-17 DIAGNOSIS — R92.8 ABNORMAL MAMMOGRAM: ICD-10-CM

## 2024-09-17 PROCEDURE — 77066 DX MAMMO INCL CAD BI: CPT | Performed by: RADIOLOGY

## 2024-09-17 PROCEDURE — 77062 BREAST TOMOSYNTHESIS BI: CPT | Performed by: RADIOLOGY

## 2024-09-17 PROCEDURE — 77062 BREAST TOMOSYNTHESIS BI: CPT

## 2024-11-25 DIAGNOSIS — E11.65 TYPE 2 DIABETES MELLITUS WITH HYPERGLYCEMIA, WITHOUT LONG-TERM CURRENT USE OF INSULIN: ICD-10-CM

## 2024-11-26 RX ORDER — SEMAGLUTIDE 0.68 MG/ML
INJECTION, SOLUTION SUBCUTANEOUS
Qty: 3 ML | Refills: 2 | Status: SHIPPED | OUTPATIENT
Start: 2024-11-26

## 2024-12-12 DIAGNOSIS — E11.65 TYPE 2 DIABETES MELLITUS WITH HYPERGLYCEMIA, WITHOUT LONG-TERM CURRENT USE OF INSULIN: Primary | ICD-10-CM

## 2024-12-13 ENCOUNTER — LAB (OUTPATIENT)
Dept: LAB | Facility: LAB | Age: 49
End: 2024-12-13
Payer: COMMERCIAL

## 2024-12-13 DIAGNOSIS — E11.65 TYPE 2 DIABETES MELLITUS WITH HYPERGLYCEMIA, WITHOUT LONG-TERM CURRENT USE OF INSULIN: ICD-10-CM

## 2024-12-13 LAB
CREAT UR-MCNC: 137.2 MG/DL (ref 20–320)
EST. AVERAGE GLUCOSE BLD GHB EST-MCNC: 123 MG/DL
HBA1C MFR BLD: 5.9 %
MICROALBUMIN UR-MCNC: <7 MG/L
MICROALBUMIN/CREAT UR: NORMAL MG/G{CREAT}

## 2024-12-13 PROCEDURE — 83036 HEMOGLOBIN GLYCOSYLATED A1C: CPT

## 2024-12-13 PROCEDURE — 82570 ASSAY OF URINE CREATININE: CPT

## 2024-12-13 PROCEDURE — 82043 UR ALBUMIN QUANTITATIVE: CPT

## 2024-12-13 PROCEDURE — 36415 COLL VENOUS BLD VENIPUNCTURE: CPT

## 2024-12-17 ENCOUNTER — APPOINTMENT (OUTPATIENT)
Dept: PRIMARY CARE | Facility: CLINIC | Age: 49
End: 2024-12-17
Payer: COMMERCIAL

## 2024-12-20 ENCOUNTER — APPOINTMENT (OUTPATIENT)
Dept: PRIMARY CARE | Facility: CLINIC | Age: 49
End: 2024-12-20
Payer: COMMERCIAL

## 2024-12-20 VITALS
SYSTOLIC BLOOD PRESSURE: 110 MMHG | HEART RATE: 94 BPM | WEIGHT: 208 LBS | BODY MASS INDEX: 36.85 KG/M2 | OXYGEN SATURATION: 98 % | DIASTOLIC BLOOD PRESSURE: 73 MMHG | TEMPERATURE: 98.2 F

## 2024-12-20 DIAGNOSIS — Z82.49 FAMILY HISTORY OF HEART DISEASE: ICD-10-CM

## 2024-12-20 DIAGNOSIS — E11.65 TYPE 2 DIABETES MELLITUS WITH HYPERGLYCEMIA, WITHOUT LONG-TERM CURRENT USE OF INSULIN: Primary | ICD-10-CM

## 2024-12-20 DIAGNOSIS — E55.9 VITAMIN D DEFICIENCY: ICD-10-CM

## 2024-12-20 PROCEDURE — 3074F SYST BP LT 130 MM HG: CPT | Performed by: STUDENT IN AN ORGANIZED HEALTH CARE EDUCATION/TRAINING PROGRAM

## 2024-12-20 PROCEDURE — 3078F DIAST BP <80 MM HG: CPT | Performed by: STUDENT IN AN ORGANIZED HEALTH CARE EDUCATION/TRAINING PROGRAM

## 2024-12-20 PROCEDURE — 99213 OFFICE O/P EST LOW 20 MIN: CPT | Performed by: STUDENT IN AN ORGANIZED HEALTH CARE EDUCATION/TRAINING PROGRAM

## 2024-12-20 PROCEDURE — 4010F ACE/ARB THERAPY RXD/TAKEN: CPT | Performed by: STUDENT IN AN ORGANIZED HEALTH CARE EDUCATION/TRAINING PROGRAM

## 2024-12-20 PROCEDURE — 3062F POS MACROALBUMINURIA REV: CPT | Performed by: STUDENT IN AN ORGANIZED HEALTH CARE EDUCATION/TRAINING PROGRAM

## 2024-12-20 PROCEDURE — 3044F HG A1C LEVEL LT 7.0%: CPT | Performed by: STUDENT IN AN ORGANIZED HEALTH CARE EDUCATION/TRAINING PROGRAM

## 2024-12-20 ASSESSMENT — ENCOUNTER SYMPTOMS
SHORTNESS OF BREATH: 0
FATIGUE: 0
ABDOMINAL PAIN: 0
FEVER: 0
CHILLS: 0

## 2024-12-20 NOTE — PROGRESS NOTES
Subjective   Patient ID: Pamela Sexton is a 48 y.o. female who presents for Follow-up.    HPI     Overall doing well.  No acute concerns complaints today.  She has been continuing with her current medications for her diabetes.  A1c overall stable with continued improvement.    Review of Systems   Constitutional:  Negative for chills, fatigue and fever.   Respiratory:  Negative for shortness of breath.    Cardiovascular:  Negative for chest pain.   Gastrointestinal:  Negative for abdominal pain.       Objective   /73   Pulse 94   Temp 36.8 °C (98.2 °F)   Wt 94.3 kg (208 lb)   SpO2 98%   BMI 36.85 kg/m²     Physical Exam  Vitals and nursing note reviewed.   Constitutional:       General: She is not in acute distress.     Appearance: Normal appearance. She is obese. She is not ill-appearing or toxic-appearing.   HENT:      Head: Normocephalic and atraumatic.   Cardiovascular:      Rate and Rhythm: Normal rate and regular rhythm.   Neurological:      Mental Status: She is alert.         Assessment/Plan   Problem List Items Addressed This Visit             ICD-10-CM    Type 2 diabetes mellitus with hyperglycemia, without long-term current use of insulin - Primary E11.65    Relevant Orders    CBC and Auto Differential    Comprehensive Metabolic Panel    Lipid Panel    TSH with reflex to Free T4 if abnormal    Hemoglobin A1C    Albumin-Creatinine Ratio, Urine Random    Vitamin D deficiency E55.9    Relevant Orders    Vitamin D 25-Hydroxy,Total (for eval of Vitamin D levels)    Family history of heart disease Z82.49     Maternal uncle diagnosed in his 67          History and physical examination as above.  Patient coming in for 6-month follow-up for her diabetes.  A1c is currently at 5.9%.  Encouraged patient to keep up the good work along with healthy diet and regular exercise.  Patient will get set up for a wellness examination in July 2025.  We will plan on getting an EKG at that appointment.    We also  plan on reordering a repeat ultrasound of the liver and she will also be due for her mammogram.  Fasting blood work orders placed for the patient to be done prior to that next appointment.  She will call in sooner with other acute concerns or complaints.

## 2024-12-20 NOTE — PATIENT INSTRUCTIONS
Overall A1c and everything looks really well-controlled.  Keep up all the excellent work.    Please get set up for a wellness examination with me for July 2025.  This can get scheduled before you leave here today.    We will plan on getting an EKG at that appointment and it can be done first thing once you have brought into that appointment at that time.  Will plan on doing a repeat ultrasound of the liver at that time as well as in addition to your mammogram.    Please call with any additional questions or concerns.    Thank you

## 2025-01-26 DIAGNOSIS — E11.65 TYPE 2 DIABETES MELLITUS WITH HYPERGLYCEMIA, WITHOUT LONG-TERM CURRENT USE OF INSULIN: ICD-10-CM

## 2025-01-28 RX ORDER — METFORMIN HYDROCHLORIDE 500 MG/1
TABLET, EXTENDED RELEASE ORAL
Qty: 360 TABLET | Refills: 1 | Status: SHIPPED | OUTPATIENT
Start: 2025-01-28

## 2025-01-28 RX ORDER — LISINOPRIL 2.5 MG/1
2.5 TABLET ORAL DAILY
Qty: 90 TABLET | Refills: 1 | Status: SHIPPED | OUTPATIENT
Start: 2025-01-28 | End: 2025-07-27

## 2025-02-18 DIAGNOSIS — E11.65 TYPE 2 DIABETES MELLITUS WITH HYPERGLYCEMIA, WITHOUT LONG-TERM CURRENT USE OF INSULIN: ICD-10-CM

## 2025-02-18 DIAGNOSIS — K76.0 FATTY LIVER: Primary | ICD-10-CM

## 2025-05-16 LAB
25(OH)D3+25(OH)D2 SERPL-MCNC: 17 NG/ML (ref 30–100)
ALBUMIN SERPL-MCNC: 4.3 G/DL (ref 3.6–5.1)
ALBUMIN/CREAT UR: 8 MG/G CREAT
ALP SERPL-CCNC: 62 U/L (ref 31–125)
ALT SERPL-CCNC: 125 U/L (ref 6–29)
ANION GAP SERPL CALCULATED.4IONS-SCNC: 10 MMOL/L (CALC) (ref 7–17)
AST SERPL-CCNC: 109 U/L (ref 10–35)
BASOPHILS # BLD AUTO: 68 CELLS/UL (ref 0–200)
BASOPHILS NFR BLD AUTO: 0.7 %
BILIRUB SERPL-MCNC: 0.6 MG/DL (ref 0.2–1.2)
BUN SERPL-MCNC: 11 MG/DL (ref 7–25)
CALCIUM SERPL-MCNC: 9.2 MG/DL (ref 8.6–10.2)
CHLORIDE SERPL-SCNC: 106 MMOL/L (ref 98–110)
CHOLEST SERPL-MCNC: 174 MG/DL
CHOLEST/HDLC SERPL: 3.2 (CALC)
CO2 SERPL-SCNC: 19 MMOL/L (ref 20–32)
CREAT SERPL-MCNC: 0.73 MG/DL (ref 0.5–0.99)
CREAT UR-MCNC: 106 MG/DL (ref 20–275)
EGFRCR SERPLBLD CKD-EPI 2021: 101 ML/MIN/1.73M2
EOSINOPHIL # BLD AUTO: 378 CELLS/UL (ref 15–500)
EOSINOPHIL NFR BLD AUTO: 3.9 %
ERYTHROCYTE [DISTWIDTH] IN BLOOD BY AUTOMATED COUNT: 13 % (ref 11–15)
EST. AVERAGE GLUCOSE BLD GHB EST-MCNC: 197 MG/DL
EST. AVERAGE GLUCOSE BLD GHB EST-SCNC: 10.9 MMOL/L
GLUCOSE SERPL-MCNC: 211 MG/DL (ref 65–99)
HBA1C MFR BLD: 8.5 %
HCT VFR BLD AUTO: 43 % (ref 35–45)
HDLC SERPL-MCNC: 55 MG/DL
HGB BLD-MCNC: 13.9 G/DL (ref 11.7–15.5)
LDLC SERPL CALC-MCNC: 95 MG/DL (CALC)
LYMPHOCYTES # BLD AUTO: 1989 CELLS/UL (ref 850–3900)
LYMPHOCYTES NFR BLD AUTO: 20.5 %
MCH RBC QN AUTO: 27.7 PG (ref 27–33)
MCHC RBC AUTO-ENTMCNC: 32.3 G/DL (ref 32–36)
MCV RBC AUTO: 85.7 FL (ref 80–100)
MICROALBUMIN UR-MCNC: 0.9 MG/DL
MONOCYTES # BLD AUTO: 640 CELLS/UL (ref 200–950)
MONOCYTES NFR BLD AUTO: 6.6 %
NEUTROPHILS # BLD AUTO: 6625 CELLS/UL (ref 1500–7800)
NEUTROPHILS NFR BLD AUTO: 68.3 %
NONHDLC SERPL-MCNC: 119 MG/DL (CALC)
PLATELET # BLD AUTO: 340 THOUSAND/UL (ref 140–400)
PMV BLD REES-ECKER: 10.6 FL (ref 7.5–12.5)
POTASSIUM SERPL-SCNC: 4.3 MMOL/L (ref 3.5–5.3)
PROT SERPL-MCNC: 7.2 G/DL (ref 6.1–8.1)
RBC # BLD AUTO: 5.02 MILLION/UL (ref 3.8–5.1)
SODIUM SERPL-SCNC: 135 MMOL/L (ref 135–146)
TRIGL SERPL-MCNC: 139 MG/DL
TSH SERPL-ACNC: 1.93 MIU/L
WBC # BLD AUTO: 9.7 THOUSAND/UL (ref 3.8–10.8)

## 2025-05-21 DIAGNOSIS — E11.65 TYPE 2 DIABETES MELLITUS WITH HYPERGLYCEMIA, WITHOUT LONG-TERM CURRENT USE OF INSULIN: Primary | ICD-10-CM

## 2025-05-21 RX ORDER — DULAGLUTIDE 0.75 MG/.5ML
0.75 INJECTION, SOLUTION SUBCUTANEOUS WEEKLY
Qty: 2 ML | Refills: 0 | Status: SHIPPED | OUTPATIENT
Start: 2025-05-21

## 2025-06-20 ENCOUNTER — APPOINTMENT (OUTPATIENT)
Dept: PRIMARY CARE | Facility: CLINIC | Age: 50
End: 2025-06-20
Payer: COMMERCIAL

## 2025-06-20 VITALS
HEART RATE: 81 BPM | DIASTOLIC BLOOD PRESSURE: 78 MMHG | BODY MASS INDEX: 37.78 KG/M2 | WEIGHT: 213.2 LBS | SYSTOLIC BLOOD PRESSURE: 118 MMHG | HEIGHT: 63 IN

## 2025-06-20 DIAGNOSIS — Z82.49 FAMILY HISTORY OF HEART DISEASE: ICD-10-CM

## 2025-06-20 DIAGNOSIS — Z00.00 ENCOUNTER FOR ADULT WELLNESS VISIT: Primary | ICD-10-CM

## 2025-06-20 DIAGNOSIS — E11.65 TYPE 2 DIABETES MELLITUS WITH HYPERGLYCEMIA, WITHOUT LONG-TERM CURRENT USE OF INSULIN: ICD-10-CM

## 2025-06-20 DIAGNOSIS — E55.9 VITAMIN D DEFICIENCY: ICD-10-CM

## 2025-06-20 DIAGNOSIS — Z12.31 BREAST CANCER SCREENING BY MAMMOGRAM: ICD-10-CM

## 2025-06-20 DIAGNOSIS — R79.89 ELEVATED LFTS: ICD-10-CM

## 2025-06-20 DIAGNOSIS — K76.0 FATTY LIVER: ICD-10-CM

## 2025-06-20 DIAGNOSIS — R94.31 ABNORMAL EKG: ICD-10-CM

## 2025-06-20 PROCEDURE — 93000 ELECTROCARDIOGRAM COMPLETE: CPT | Performed by: STUDENT IN AN ORGANIZED HEALTH CARE EDUCATION/TRAINING PROGRAM

## 2025-06-20 PROCEDURE — 3078F DIAST BP <80 MM HG: CPT | Performed by: STUDENT IN AN ORGANIZED HEALTH CARE EDUCATION/TRAINING PROGRAM

## 2025-06-20 PROCEDURE — 4010F ACE/ARB THERAPY RXD/TAKEN: CPT | Performed by: STUDENT IN AN ORGANIZED HEALTH CARE EDUCATION/TRAINING PROGRAM

## 2025-06-20 PROCEDURE — 3074F SYST BP LT 130 MM HG: CPT | Performed by: STUDENT IN AN ORGANIZED HEALTH CARE EDUCATION/TRAINING PROGRAM

## 2025-06-20 PROCEDURE — 1036F TOBACCO NON-USER: CPT | Performed by: STUDENT IN AN ORGANIZED HEALTH CARE EDUCATION/TRAINING PROGRAM

## 2025-06-20 PROCEDURE — 99396 PREV VISIT EST AGE 40-64: CPT | Performed by: STUDENT IN AN ORGANIZED HEALTH CARE EDUCATION/TRAINING PROGRAM

## 2025-06-20 PROCEDURE — 3008F BODY MASS INDEX DOCD: CPT | Performed by: STUDENT IN AN ORGANIZED HEALTH CARE EDUCATION/TRAINING PROGRAM

## 2025-06-20 ASSESSMENT — ENCOUNTER SYMPTOMS
ARTHRALGIAS: 0
NERVOUS/ANXIOUS: 0
CHILLS: 0
DYSPHORIC MOOD: 0
VOMITING: 0
OCCASIONAL FEELINGS OF UNSTEADINESS: 0
FEVER: 0
DYSURIA: 0
FATIGUE: 0
ABDOMINAL PAIN: 0
COUGH: 0
LIGHT-HEADEDNESS: 0
RHINORRHEA: 0
DIZZINESS: 0
MYALGIAS: 0
NUMBNESS: 0
LOSS OF SENSATION IN FEET: 0
DEPRESSION: 0
FREQUENCY: 0
SHORTNESS OF BREATH: 0
CONSTIPATION: 0
COLOR CHANGE: 0
DIARRHEA: 0
NAUSEA: 0
PALPITATIONS: 0

## 2025-06-20 NOTE — PROGRESS NOTES
"Subjective   Patient ID: Pamela Sexton is a 49 y.o. female who presents for Annual Exam.    HPI     She restarted with taking her metformin on a regular basis.  She also had not been taking her Ozempic per her previous dosing.  She definitely thinks that that had negatively impacted her blood sugar levels.    Dental: Twice yearly exams.  Vision: Corrective lenses with glasses. Yearly exams.    Diet: Well balanced diet. Diabetic diet.   Exercise: None.  Caffeine: Coffee maybe 1 cup daily.  Fluids: Well hydrated.  Supplements: Multivitamin    Tobacco: None. Previously smoked for 3 years. Back in high school.  Alcohol: Rarely. 2-3 times a year.  Recreational Drugs: None    Last Colonoscopy: 08/15/2023. Follow up in 5 years in 2028.   Last Pap smear: Following with Ob/Gyn February 2025  Mammogram: 09/2024. Negative. Recommended yearly mammogram screening  Low Dose Lung CT Screening: Not indicated.  AAA Screening: Not indicated.    Influenza: 10/17/2024  Tetanus: Recommended  COVID: Recommended updated vaccine.    Review of Systems   Constitutional:  Negative for chills, fatigue and fever.   HENT:  Negative for congestion and rhinorrhea.    Eyes:  Negative for visual disturbance.   Respiratory:  Negative for cough and shortness of breath.    Cardiovascular:  Negative for chest pain and palpitations.   Gastrointestinal:  Negative for abdominal pain, constipation, diarrhea, nausea and vomiting.   Genitourinary:  Negative for dysuria and frequency.   Musculoskeletal:  Negative for arthralgias and myalgias.   Skin:  Negative for color change and rash.   Neurological:  Negative for dizziness, light-headedness and numbness.   Psychiatric/Behavioral:  Negative for dysphoric mood. The patient is not nervous/anxious.        Objective   /78   Pulse 81   Ht 1.588 m (5' 2.5\")   Wt 96.7 kg (213 lb 3.2 oz)   BMI 38.37 kg/m²   BSA Body surface area is 2.06 meters squared.      Physical Exam  Vitals and nursing note " reviewed.   Constitutional:       General: She is not in acute distress.     Appearance: Normal appearance. She is normal weight. She is not ill-appearing or toxic-appearing.   HENT:      Head: Normocephalic and atraumatic.      Right Ear: Tympanic membrane, ear canal and external ear normal.      Left Ear: Tympanic membrane, ear canal and external ear normal.      Nose: Nose normal.      Mouth/Throat:      Mouth: Mucous membranes are moist.   Eyes:      Extraocular Movements: Extraocular movements intact.      Conjunctiva/sclera: Conjunctivae normal.      Pupils: Pupils are equal, round, and reactive to light.   Cardiovascular:      Rate and Rhythm: Normal rate and regular rhythm.      Pulses: Normal pulses.      Heart sounds: Normal heart sounds.   Pulmonary:      Effort: Pulmonary effort is normal.      Breath sounds: Normal breath sounds.   Abdominal:      General: Abdomen is flat. Bowel sounds are normal.      Palpations: Abdomen is soft.   Musculoskeletal:         General: Normal range of motion.      Cervical back: Normal range of motion and neck supple.   Skin:     General: Skin is warm.   Neurological:      General: No focal deficit present.      Mental Status: She is alert and oriented to person, place, and time. Mental status is at baseline.      Cranial Nerves: No cranial nerve deficit.      Sensory: No sensory deficit.   Psychiatric:         Mood and Affect: Mood normal.         Behavior: Behavior normal.         Thought Content: Thought content normal.         Judgment: Judgment normal.       Orders Only on 05/15/2025   Component Date Value Ref Range Status    CHOLESTEROL, TOTAL 05/15/2025 174  <200 mg/dL Final    HDL CHOLESTEROL 05/15/2025 55  > OR = 50 mg/dL Final    TRIGLYCERIDES 05/15/2025 139  <150 mg/dL Final    LDL-CHOLESTEROL 05/15/2025 95  mg/dL (calc) Final    Comment: Reference range: <100     Desirable range <100 mg/dL for primary prevention;    <70 mg/dL for patients with CHD or diabetic  patients   with > or = 2 CHD risk factors.     LDL-C is now calculated using the Mike   calculation, which is a validated novel method providing   better accuracy than the Friedewald equation in the   estimation of LDL-C.   Fox BUCHANAN et al. PRIYANKA. 2013;310(19): 5246-5664   (http://education.[employer]/faq/LTA100)      CHOL/HDLC RATIO 05/15/2025 3.2  <5.0 (calc) Final    NON HDL CHOLESTEROL 05/15/2025 119  <130 mg/dL (calc) Final    Comment: For patients with diabetes plus 1 major ASCVD risk   factor, treating to a non-HDL-C goal of <100 mg/dL   (LDL-C of <70 mg/dL) is considered a therapeutic   option.      GLUCOSE 05/15/2025 211 (H)  65 - 99 mg/dL Final    Comment:               Fasting reference interval     For someone without known diabetes, a glucose  value >125 mg/dL indicates that they may have  diabetes and this should be confirmed with a  follow-up test.         UREA NITROGEN (BUN) 05/15/2025 11  7 - 25 mg/dL Final    CREATININE 05/15/2025 0.73  0.50 - 0.99 mg/dL Final    EGFR 05/15/2025 101  > OR = 60 mL/min/1.73m2 Final    SODIUM 05/15/2025 135  135 - 146 mmol/L Final    POTASSIUM 05/15/2025 4.3  3.5 - 5.3 mmol/L Final    CHLORIDE 05/15/2025 106  98 - 110 mmol/L Final    CARBON DIOXIDE 05/15/2025 19 (L)  20 - 32 mmol/L Final    ELECTROLYTE BALANCE 05/15/2025 10  7 - 17 mmol/L (calc) Final    CALCIUM 05/15/2025 9.2  8.6 - 10.2 mg/dL Final    PROTEIN, TOTAL 05/15/2025 7.2  6.1 - 8.1 g/dL Final    ALBUMIN 05/15/2025 4.3  3.6 - 5.1 g/dL Final    BILIRUBIN, TOTAL 05/15/2025 0.6  0.2 - 1.2 mg/dL Final    ALKALINE PHOSPHATASE 05/15/2025 62  31 - 125 U/L Final    AST 05/15/2025 109 (H)  10 - 35 U/L Final    ALT 05/15/2025 125 (H)  6 - 29 U/L Final    CREATININE, RANDOM URINE 05/15/2025 106  20 - 275 mg/dL Final    ALBUMIN, URINE 05/15/2025 0.9  See Note: mg/dL Final    Comment: Reference Range:    Reference Range  Not established      ALBUMIN/CREATININE RATIO, RANDOM U* 05/15/2025 8  <30 mg/g  creat Final    Comment:    The ADA defines abnormalities in albumin  excretion as follows:     Albuminuria Category        Result (mg/g creatinine)     Normal to Mildly increased   <30  Moderately increased            Severely increased           > OR = 300     The ADA recommends that at least two of three  specimens collected within a 3-6 month period be  abnormal before considering a patient to be  within a diagnostic category.      WHITE BLOOD CELL COUNT 05/15/2025 9.7  3.8 - 10.8 Thousand/uL Final    RED BLOOD CELL COUNT 05/15/2025 5.02  3.80 - 5.10 Million/uL Final    HEMOGLOBIN 05/15/2025 13.9  11.7 - 15.5 g/dL Final    HEMATOCRIT 05/15/2025 43.0  35.0 - 45.0 % Final    MCV 05/15/2025 85.7  80.0 - 100.0 fL Final    MCH 05/15/2025 27.7  27.0 - 33.0 pg Final    MCHC 05/15/2025 32.3  32.0 - 36.0 g/dL Final    Comment: For adults, a slight decrease in the calculated MCHC  value (in the range of 30 to 32 g/dL) is most likely  not clinically significant; however, it should be  interpreted with caution in correlation with other  red cell parameters and the patient's clinical  condition.      RDW 05/15/2025 13.0  11.0 - 15.0 % Final    PLATELET COUNT 05/15/2025 340  140 - 400 Thousand/uL Final    MPV 05/15/2025 10.6  7.5 - 12.5 fL Final    ABSOLUTE NEUTROPHILS 05/15/2025 6,625  1,500 - 7,800 cells/uL Final    ABSOLUTE LYMPHOCYTES 05/15/2025 1,989  850 - 3,900 cells/uL Final    ABSOLUTE MONOCYTES 05/15/2025 640  200 - 950 cells/uL Final    ABSOLUTE EOSINOPHILS 05/15/2025 378  15 - 500 cells/uL Final    ABSOLUTE BASOPHILS 05/15/2025 68  0 - 200 cells/uL Final    NEUTROPHILS 05/15/2025 68.3  % Final    LYMPHOCYTES 05/15/2025 20.5  % Final    MONOCYTES 05/15/2025 6.6  % Final    EOSINOPHILS 05/15/2025 3.9  % Final    BASOPHILS 05/15/2025 0.7  % Final    TSH W/REFLEX TO FT4 05/15/2025 1.93  mIU/L Final    Comment:           Reference Range                         > or = 20 Years  0.40-4.50                               Pregnancy Ranges            First trimester    0.26-2.66            Second trimester   0.55-2.73            Third trimester    0.43-2.91      VITAMIN D,25-OH,TOTAL,IA 05/15/2025 17 (L)  30 - 100 ng/mL Final    Comment: Vitamin D Status         25-OH Vitamin D:     Deficiency:                    <20 ng/mL  Insufficiency:             20 - 29 ng/mL  Optimal:                 > or = 30 ng/mL     For 25-OH Vitamin D testing on patients on   D2-supplementation and patients for whom quantitation   of D2 and D3 fractions is required, the QuestAssureD(TM)  25-OH VIT D, (D2,D3), LC/MS/MS is recommended: order   code 85939 (patients >2yrs).     See Note 1     Note 1     For additional information, please refer to   http://education.[employer]/faq/RFP215   (This link is being provided for informational/  educational purposes only.)      HEMOGLOBIN A1c 05/15/2025 8.5 (H)  <5.7 % Final    Comment: For someone without known diabetes, a hemoglobin A1c  value of 6.5% or greater indicates that they may have   diabetes and this should be confirmed with a follow-up   test.     For someone with known diabetes, a value <7% indicates   that their diabetes is well controlled and a value   greater than or equal to 7% indicates suboptimal   control. A1c targets should be individualized based on   duration of diabetes, age, comorbid conditions, and   other considerations.     Currently, no consensus exists regarding use of  hemoglobin A1c for diagnosis of diabetes for children.          eAG (mg/dL) 05/15/2025 197  mg/dL Final    eAG (mmol/L) 05/15/2025 10.9  mmol/L Final   Lab on 12/13/2024   Component Date Value Ref Range Status    Hemoglobin A1C 12/13/2024 5.9 (H)  See comment % Final    Estimated Average Glucose 12/13/2024 123  Not Established mg/dL Final    Albumin, Urine Random 12/13/2024 <7.0  Not established mg/L Final    Creatinine, Urine Random 12/13/2024 137.2  20.0 - 320.0 mg/dL Final    Albumin/Creatinine Ratio  12/13/2024    Final    One or more analytes used in this calculation is outside of the analytical measurement range. Calculation cannot be performed.   Lab on 09/12/2024   Component Date Value Ref Range Status    Hemoglobin A1C 09/12/2024 6.2 (H)  see below % Final    Estimated Average Glucose 09/12/2024 131  Not Established mg/dL Final     Medications Ordered Prior to Encounter[1]  No images are attached to the encounter.        Assessment/Plan   Problem List Items Addressed This Visit           ICD-10-CM    Type 2 diabetes mellitus with hyperglycemia, without long-term current use of insulin E11.65    Relevant Orders    Hemoglobin A1C    ECG 12 lead (Clinic Performed) (Completed)    Vitamin D deficiency E55.9    Relevant Orders    Vitamin D 25-Hydroxy,Total (for eval of Vitamin D levels)    Fatty liver K76.0    Family history of heart disease Z82.49     Other Visit Diagnoses         Codes      Encounter for adult wellness visit    -  Primary Z00.00    Relevant Orders    ECG 12 lead (Clinic Performed) (Completed)      Breast cancer screening by mammogram     Z12.31    Relevant Orders    BI mammo bilateral screening tomosynthesis      Elevated LFTs     R79.89    Relevant Orders    Comprehensive Metabolic Panel    ECG 12 lead (Clinic Performed) (Completed)      Abnormal EKG     R94.31          History and physical examination as above.  Patient coming in for wellness examination.  No acute concerns or complaints today.  Did discuss patient's recent blood work results and she states that she did restart back on her metformin as well as taking her Ozempic on a regular basis.  Did discuss that this would have a significant improvement with her blood sugar levels and we will plan on getting a repeat A1c later in August.  Discussed other aspects of health including healthy diet and exercise, regular dental and vision care, caffeine use, hydration, supplementation, substance use, screening tests and immunizations.   Recommendations given as documented.  Mammogram order placed for the patient.  We will also plan on getting a repeat vitamin D level.  EKG performed in the office for baseline.  No acute findings but showing poor R wave progression as well as Q waves in lead 2, lead III and lead aVF.  We will plan for a stress test for follow-up due to family history as well as findings.  Patient will continue with regular wellness care.  She will come in sooner with any other acute concerns or complaints.          [1]   Current Outpatient Medications on File Prior to Visit   Medication Sig Dispense Refill    lisinopril 2.5 mg tablet Take 1 tablet (2.5 mg) by mouth once daily. 90 tablet 1    metFORMIN  mg 24 hr tablet Take 2 tablets (1,000 mg) by mouth 2 times a day with meals. Do not crush, chew, or split. 360 tablet 1    multivitamin tablet Take 1 tablet by mouth once daily.      Ozempic 0.25 mg or 0.5 mg (2 mg/3 mL) pen injector INJECT 0.5 mg SUBCUTANEOUSLY (UNDER THE SKIN) ONCE A WEEK 3 mL 2     No current facility-administered medications on file prior to visit.

## 2025-06-27 PROBLEM — M76.50 PATELLAR TENDINITIS: Status: RESOLVED | Noted: 2022-03-11 | Resolved: 2025-06-27

## 2025-06-27 PROBLEM — M77.50 TENDINITIS OF ANKLE: Status: RESOLVED | Noted: 2022-03-11 | Resolved: 2025-06-27

## 2025-07-08 LAB
ALBUMIN SERPL-MCNC: 4.6 G/DL (ref 3.6–5.1)
ALP SERPL-CCNC: 61 U/L (ref 31–125)
ALT SERPL-CCNC: 111 U/L (ref 6–29)
ANION GAP SERPL CALCULATED.4IONS-SCNC: 10 MMOL/L (CALC) (ref 7–17)
AST SERPL-CCNC: 81 U/L (ref 10–35)
BILIRUB SERPL-MCNC: 0.5 MG/DL (ref 0.2–1.2)
BUN SERPL-MCNC: 9 MG/DL (ref 7–25)
CALCIUM SERPL-MCNC: 9.7 MG/DL (ref 8.6–10.2)
CHLORIDE SERPL-SCNC: 103 MMOL/L (ref 98–110)
CO2 SERPL-SCNC: 24 MMOL/L (ref 20–32)
CREAT SERPL-MCNC: 0.69 MG/DL (ref 0.5–0.99)
EGFRCR SERPLBLD CKD-EPI 2021: 107 ML/MIN/1.73M2
GLUCOSE SERPL-MCNC: 95 MG/DL (ref 65–139)
POTASSIUM SERPL-SCNC: 4.1 MMOL/L (ref 3.5–5.3)
PROT SERPL-MCNC: 7.6 G/DL (ref 6.1–8.1)
SODIUM SERPL-SCNC: 137 MMOL/L (ref 135–146)

## 2025-07-14 ENCOUNTER — HOSPITAL ENCOUNTER (OUTPATIENT)
Dept: CARDIOLOGY | Facility: CLINIC | Age: 50
Discharge: HOME | End: 2025-07-14
Payer: COMMERCIAL

## 2025-07-14 DIAGNOSIS — Z82.49 FAMILY HISTORY OF HEART DISEASE: ICD-10-CM

## 2025-07-14 DIAGNOSIS — R94.31 ABNORMAL EKG: ICD-10-CM

## 2025-07-14 DIAGNOSIS — Z00.00 ENCOUNTER FOR ADULT WELLNESS VISIT: ICD-10-CM

## 2025-07-14 DIAGNOSIS — E11.65 TYPE 2 DIABETES MELLITUS WITH HYPERGLYCEMIA, WITHOUT LONG-TERM CURRENT USE OF INSULIN: ICD-10-CM

## 2025-07-14 PROCEDURE — 93018 CV STRESS TEST I&R ONLY: CPT | Performed by: STUDENT IN AN ORGANIZED HEALTH CARE EDUCATION/TRAINING PROGRAM

## 2025-07-14 PROCEDURE — 93017 CV STRESS TEST TRACING ONLY: CPT

## 2025-07-14 PROCEDURE — 93016 CV STRESS TEST SUPVJ ONLY: CPT | Performed by: STUDENT IN AN ORGANIZED HEALTH CARE EDUCATION/TRAINING PROGRAM

## 2025-08-15 ENCOUNTER — APPOINTMENT (OUTPATIENT)
Dept: RADIOLOGY | Facility: CLINIC | Age: 50
End: 2025-08-15
Payer: COMMERCIAL

## 2025-08-25 DIAGNOSIS — E11.65 TYPE 2 DIABETES MELLITUS WITH HYPERGLYCEMIA, WITHOUT LONG-TERM CURRENT USE OF INSULIN: ICD-10-CM

## 2025-08-25 RX ORDER — METFORMIN HYDROCHLORIDE 500 MG/1
TABLET, EXTENDED RELEASE ORAL
Qty: 360 TABLET | Refills: 1 | Status: SHIPPED | OUTPATIENT
Start: 2025-08-25

## 2025-08-27 ENCOUNTER — TELEPHONE (OUTPATIENT)
Dept: PRIMARY CARE | Facility: CLINIC | Age: 50
End: 2025-08-27
Payer: COMMERCIAL

## 2025-08-27 DIAGNOSIS — R79.89 ELEVATED LFTS: ICD-10-CM

## 2025-08-27 DIAGNOSIS — E11.65 TYPE 2 DIABETES MELLITUS WITH HYPERGLYCEMIA, WITHOUT LONG-TERM CURRENT USE OF INSULIN: ICD-10-CM

## 2025-08-27 DIAGNOSIS — E55.9 VITAMIN D DEFICIENCY: ICD-10-CM

## 2025-08-28 DIAGNOSIS — E11.65 TYPE 2 DIABETES MELLITUS WITH HYPERGLYCEMIA, WITHOUT LONG-TERM CURRENT USE OF INSULIN: ICD-10-CM

## 2025-08-29 RX ORDER — SEMAGLUTIDE 0.68 MG/ML
0.5 INJECTION, SOLUTION SUBCUTANEOUS WEEKLY
Qty: 3 ML | Refills: 2 | Status: SHIPPED | OUTPATIENT
Start: 2025-08-29

## 2025-09-02 ENCOUNTER — HOSPITAL ENCOUNTER (OUTPATIENT)
Dept: RADIOLOGY | Facility: CLINIC | Age: 50
End: 2025-09-02
Payer: COMMERCIAL

## 2025-09-03 LAB
25(OH)D3+25(OH)D2 SERPL-MCNC: 31 NG/ML (ref 30–100)
ALBUMIN SERPL-MCNC: 4.3 G/DL (ref 3.6–5.1)
ALP SERPL-CCNC: 55 U/L (ref 31–125)
ALT SERPL-CCNC: 64 U/L (ref 6–29)
ANION GAP SERPL CALCULATED.4IONS-SCNC: 9 MMOL/L (CALC) (ref 7–17)
AST SERPL-CCNC: 46 U/L (ref 10–35)
BILIRUB SERPL-MCNC: 0.4 MG/DL (ref 0.2–1.2)
BUN SERPL-MCNC: 8 MG/DL (ref 7–25)
CALCIUM SERPL-MCNC: 9 MG/DL (ref 8.6–10.2)
CHLORIDE SERPL-SCNC: 104 MMOL/L (ref 98–110)
CO2 SERPL-SCNC: 25 MMOL/L (ref 20–32)
CREAT SERPL-MCNC: 0.69 MG/DL (ref 0.5–0.99)
EGFRCR SERPLBLD CKD-EPI 2021: 107 ML/MIN/1.73M2
EST. AVERAGE GLUCOSE BLD GHB EST-MCNC: 151 MG/DL
EST. AVERAGE GLUCOSE BLD GHB EST-SCNC: 8.4 MMOL/L
GGT SERPL-CCNC: 35 U/L (ref 3–55)
GLUCOSE SERPL-MCNC: 115 MG/DL (ref 65–99)
HBA1C MFR BLD: 6.9 %
POTASSIUM SERPL-SCNC: 4.1 MMOL/L (ref 3.5–5.3)
PROT SERPL-MCNC: 7.3 G/DL (ref 6.1–8.1)
SODIUM SERPL-SCNC: 138 MMOL/L (ref 135–146)

## 2025-09-09 ENCOUNTER — APPOINTMENT (OUTPATIENT)
Dept: RADIOLOGY | Facility: CLINIC | Age: 50
End: 2025-09-09
Payer: COMMERCIAL